# Patient Record
Sex: MALE | Race: BLACK OR AFRICAN AMERICAN | NOT HISPANIC OR LATINO | Employment: OTHER | URBAN - METROPOLITAN AREA
[De-identification: names, ages, dates, MRNs, and addresses within clinical notes are randomized per-mention and may not be internally consistent; named-entity substitution may affect disease eponyms.]

---

## 2020-07-14 ENCOUNTER — OFFICE VISIT (OUTPATIENT)
Dept: UROLOGY | Facility: CLINIC | Age: 45
End: 2020-07-14
Payer: OTHER GOVERNMENT

## 2020-07-14 VITALS — DIASTOLIC BLOOD PRESSURE: 83 MMHG | HEART RATE: 75 BPM | WEIGHT: 218.25 LBS | SYSTOLIC BLOOD PRESSURE: 120 MMHG

## 2020-07-14 DIAGNOSIS — Z12.5 PROSTATE CANCER SCREENING: ICD-10-CM

## 2020-07-14 DIAGNOSIS — N50.89 TESTICULAR LUMP: Primary | ICD-10-CM

## 2020-07-14 PROCEDURE — 99203 PR OFFICE/OUTPT VISIT, NEW, LEVL III, 30-44 MIN: ICD-10-PCS | Mod: S$PBB,,, | Performed by: PHYSICIAN ASSISTANT

## 2020-07-14 PROCEDURE — 99203 OFFICE O/P NEW LOW 30 MIN: CPT | Mod: PBBFAC | Performed by: PHYSICIAN ASSISTANT

## 2020-07-14 PROCEDURE — 99203 OFFICE O/P NEW LOW 30 MIN: CPT | Mod: S$PBB,,, | Performed by: PHYSICIAN ASSISTANT

## 2020-07-14 PROCEDURE — 99999 PR PBB SHADOW E&M-NEW PATIENT-LVL III: ICD-10-PCS | Mod: PBBFAC,,, | Performed by: PHYSICIAN ASSISTANT

## 2020-07-14 PROCEDURE — 99999 PR PBB SHADOW E&M-NEW PATIENT-LVL III: CPT | Mod: PBBFAC,,, | Performed by: PHYSICIAN ASSISTANT

## 2020-07-14 RX ORDER — DOXYCYCLINE 40 MG/1
40 CAPSULE ORAL DAILY
COMMUNITY
End: 2023-03-09

## 2020-07-14 NOTE — PROGRESS NOTES
CHIEF COMPLAINT:    Mr. Hutchins is a 44 y.o. male presenting for testicular lump.  PRESENTING ILLNESS:    Jagdish Hutchins is a 44 y.o. male who presents for right testicular lump.   He discovered it 2 weeks ago. It is not painful but it is uncomfortable.    The pain is intermittent.  Every now and then he feels it but it does not stop him from doing his daily activities.  He does not know if it has grown in size.  He denies any trauma to his testicles.    No frequency, urgency, dysuria, gross hematuria.    He denies a personal and family history of prostate cancer.    REVIEW OF SYSTEMS:    Constitutional: Negative for fever and chills.   HENT: Negative for hearing loss.   Eyes: Negative for visual disturbance.   Respiratory: Negative for shortness of breath.   Cardiovascular: Negative for chest pain.   Gastrointestinal: Negative for vomiting, and constipation.   Genitourinary: See HPI  Neurological: Negative for dizziness.   Hematological: Does not bruise/bleed easily.   Psychiatric/Behavioral: Negative for confusion.       PATIENT HISTORY:    History reviewed. No pertinent past medical history.    History reviewed. No pertinent surgical history.    History reviewed. No pertinent family history.    Social History     Socioeconomic History    Marital status:      Spouse name: Not on file    Number of children: Not on file    Years of education: Not on file    Highest education level: Not on file   Occupational History    Not on file   Social Needs    Financial resource strain: Not on file    Food insecurity     Worry: Not on file     Inability: Not on file    Transportation needs     Medical: Not on file     Non-medical: Not on file   Tobacco Use    Smoking status: Never Smoker    Smokeless tobacco: Never Used   Substance and Sexual Activity    Alcohol use: Not on file    Drug use: Not on file    Sexual activity: Not on file   Lifestyle    Physical activity     Days per week: Not on file     Minutes  per session: Not on file    Stress: Not on file   Relationships    Social connections     Talks on phone: Not on file     Gets together: Not on file     Attends Taoism service: Not on file     Active member of club or organization: Not on file     Attends meetings of clubs or organizations: Not on file     Relationship status: Not on file   Other Topics Concern    Not on file   Social History Narrative    Not on file       Allergies:  Patient has no known allergies.    Medications:    Current Outpatient Medications:     doxycycline (ORACEA) 40 mg capsule, Take 40 mg by mouth once daily., Disp: , Rfl:     PHYSICAL EXAMINATION:    Constitutional: He appears well-developed and well-nourished.  He is in no apparent distress.    Eyes: No scleral icterus noted bilaterally. No discharge bilaterally.    Cardiovascular: Normal rate.      Pulmonary/Chest: Effort normal. No respiratory distress.     Abdominal:  He exhibits no distension.      Neurological: He is alert and oriented to person, place, and time.     Skin: Skin is warm and dry.     Psych: Cooperative with normal affect.    Genitourinary: The penis is circumcised. The left testicle is normal in size, contour and no lesions noted.  There is a firm testicular lesion noted on the right testicle.  It is tender to palpation.  It is nonmobile.  The scrotum is normal in size and contour.    Normal anal sphincter tone. No rectal mass.        Physical Exam      LABS:    U/a: 1.010, pH 8, negative.    No results found for: PSA, PSADIAG, PSATOTAL, PSAFREE, PSAFREEPCT    IMPRESSION:    Encounter Diagnoses   Name Primary?    Testicular lump Yes    Prostate cancer screening          PLAN:    Will get renal ultrasound.   Discussed prostate cancer screening.  Recommended a INA and PSA.  Patient declined NIA today stating he was not prepared for it.  I informed patient that it is recommended to screen for prostate cancer. He voiced understanding.  He was advised to follow  up with urology or his pcp for INA.  He will get a PSA today.       Carlene Petersen PA-C

## 2020-07-14 NOTE — LETTER
July 14, 2020      Bala Bautista MD  [address] [place] [id number]           [facility] - Urology 4th Floor  [address]  [place] [address]  Phone: [phone]          Patient: Jagdish Hutchins   MR Number: [id number]   YOB: 1975   Date of Visit: 7/14/2020       Dear Dr. Bala Bautista:    Thank you for referring Jagdish Hutchins to me for evaluation. Attached you will find relevant portions of my assessment and plan of care.    If you have questions, please do not hesitate to call me. I look forward to following Jagdish Hutchins along with you.    Sincerely,    Carlene Petersen PA-C    Enclosure  CC:  No Recipients    If you would like to receive this communication electronically, please contact [email or web address] or [phone] to request more information on [employer] Link access.    For providers and/or their staff who would like to refer a patient to [facility], please contact us through our one-stop-shop provider referral line, [facility], at [phone].    If you feel you have received this communication in error or would no longer like to receive these types of communications, please e-mail [email or web address]

## 2020-07-17 ENCOUNTER — HOSPITAL ENCOUNTER (OUTPATIENT)
Dept: RADIOLOGY | Facility: HOSPITAL | Age: 45
Discharge: HOME | End: 2020-07-17
Attending: PHYSICIAN ASSISTANT
Payer: OTHER GOVERNMENT

## 2020-07-17 ENCOUNTER — TELEPHONE (OUTPATIENT)
Dept: UROLOGY | Facility: CLINIC | Age: 45
End: 2020-07-17

## 2020-07-17 DIAGNOSIS — N50.89 TESTICULAR LUMP: ICD-10-CM

## 2020-07-17 PROCEDURE — 76870 US EXAM SCROTUM: CPT | Mod: 26,,, | Performed by: RADIOLOGY

## 2020-07-17 PROCEDURE — 76870 US EXAM SCROTUM: CPT | Mod: TC

## 2020-07-17 PROCEDURE — 76870 US SCROTUM AND TESTICLES: ICD-10-PCS | Mod: 26,,, | Performed by: RADIOLOGY

## 2020-07-17 NOTE — TELEPHONE ENCOUNTER
Called patient's number and was unable to leave a message to discuss ultrasound.  I called his wife number and left message for he to have  call back.

## 2020-07-17 NOTE — TELEPHONE ENCOUNTER
Patient notified of his results.  Patient notified that I have sent a message for staff to review his imaging as well and I will contact him with their recommendations.

## 2020-07-20 ENCOUNTER — TELEPHONE (OUTPATIENT)
Dept: UROLOGY | Facility: CLINIC | Age: 45
End: 2020-07-20

## 2020-07-20 DIAGNOSIS — N50.89 TESTICULAR NODULE: Primary | ICD-10-CM

## 2020-07-20 RX ORDER — OXAPROZIN 600 MG/1
600 TABLET, FILM COATED ORAL EVERY 12 HOURS
Qty: 60 TABLET | Refills: 1 | Status: SHIPPED | OUTPATIENT
Start: 2020-07-20 | End: 2020-08-19

## 2020-07-20 NOTE — PROGRESS NOTES
Pt notified of Dr. Chen's recommendations.  Daypro given for pain.  F/u scrotal ultrasound ashleigh for 8/31.

## 2020-07-20 NOTE — TELEPHONE ENCOUNTER
Carlene,    On the US, it looks like a para testicular lesion ( as the report says, it appears to have a solid component in it but most likely it is a complex cyst).  Since it is tender to palpate, why don't you give him NAIDS for now and repeat US in 6 wks.

## 2020-07-20 NOTE — TELEPHONE ENCOUNTER
----- Message from aCrlene Petersen PA-C sent at 7/17/2020  3:45 PM CDT -----  Hey,  He was seen for a palpable testicular lesion that was tender to touch on exam.  I did a scrotal u/s.    Scrotal u/s showed  possible solid lesion versus complex cyst.  Do you mind looking at the images.  I just want to make sure it does not look solid and I will have him repeat u/s in 3 months.      Thanks,    Carlene

## 2020-08-31 ENCOUNTER — HOSPITAL ENCOUNTER (OUTPATIENT)
Dept: RADIOLOGY | Facility: HOSPITAL | Age: 45
Discharge: HOME | End: 2020-08-31
Attending: PHYSICIAN ASSISTANT
Payer: OTHER GOVERNMENT

## 2020-08-31 DIAGNOSIS — N50.89 TESTICULAR NODULE: ICD-10-CM

## 2020-08-31 PROCEDURE — 76870 US EXAM SCROTUM: CPT | Mod: TC

## 2020-08-31 PROCEDURE — 76870 US SCROTUM AND TESTICLES: ICD-10-PCS | Mod: 26,,, | Performed by: RADIOLOGY

## 2020-08-31 PROCEDURE — 76870 US EXAM SCROTUM: CPT | Mod: 26,,, | Performed by: RADIOLOGY

## 2020-09-02 ENCOUNTER — TELEPHONE (OUTPATIENT)
Dept: UROLOGY | Facility: CLINIC | Age: 45
End: 2020-09-02

## 2020-09-02 DIAGNOSIS — N50.89 TESTICULAR NODULE: Primary | ICD-10-CM

## 2020-09-02 NOTE — TELEPHONE ENCOUNTER
Patient notified of his testicular ultrasound results.    Lesion is stable and malignant process is less likely.  Will repeat u/s in 3 months. Recommend he continue to do self exams and contact the office if he has any concerns.

## 2020-12-08 ENCOUNTER — HOSPITAL ENCOUNTER (OUTPATIENT)
Dept: RADIOLOGY | Facility: HOSPITAL | Age: 45
Discharge: HOME | End: 2020-12-08
Attending: PHYSICIAN ASSISTANT
Payer: OTHER GOVERNMENT

## 2020-12-08 ENCOUNTER — TELEPHONE (OUTPATIENT)
Dept: UROLOGY | Facility: CLINIC | Age: 45
End: 2020-12-08

## 2020-12-08 DIAGNOSIS — N50.89 TESTICULAR NODULE: ICD-10-CM

## 2020-12-08 DIAGNOSIS — I86.1 RIGHT VARICOCELE: Primary | ICD-10-CM

## 2020-12-08 PROCEDURE — 76870 US EXAM SCROTUM: CPT | Mod: TC

## 2020-12-08 PROCEDURE — 76870 US EXAM SCROTUM: CPT | Mod: 26,,, | Performed by: RADIOLOGY

## 2020-12-08 PROCEDURE — 76870 US SCROTUM AND TESTICLES: ICD-10-PCS | Mod: 26,,, | Performed by: RADIOLOGY

## 2020-12-08 NOTE — TELEPHONE ENCOUNTER
Patient informed of scrotal ultrasound results.  Discussed varicocele.  Will get renal ultrasound to follow up on right varicocele.  Patient notified of time and date.

## 2020-12-21 ENCOUNTER — HOSPITAL ENCOUNTER (OUTPATIENT)
Dept: RADIOLOGY | Facility: HOSPITAL | Age: 45
Discharge: HOME | End: 2020-12-21
Attending: PHYSICIAN ASSISTANT
Payer: OTHER GOVERNMENT

## 2020-12-21 DIAGNOSIS — I86.1 RIGHT VARICOCELE: ICD-10-CM

## 2020-12-21 PROCEDURE — 76770 US RETROPERITONEAL COMPLETE: ICD-10-PCS | Mod: 26,,, | Performed by: INTERNAL MEDICINE

## 2020-12-21 PROCEDURE — 76770 US EXAM ABDO BACK WALL COMP: CPT | Mod: 26,,, | Performed by: INTERNAL MEDICINE

## 2020-12-21 PROCEDURE — 76770 US EXAM ABDO BACK WALL COMP: CPT | Mod: TC

## 2020-12-22 ENCOUNTER — TELEPHONE (OUTPATIENT)
Dept: UROLOGY | Facility: CLINIC | Age: 45
End: 2020-12-22

## 2021-03-19 LAB — CRC RECOMMENDATION EXT: NORMAL

## 2021-04-14 ENCOUNTER — OFFICE VISIT (OUTPATIENT)
Dept: URGENT CARE | Facility: CLINIC | Age: 46
End: 2021-04-14
Payer: OTHER GOVERNMENT

## 2021-04-14 VITALS
HEART RATE: 80 BPM | OXYGEN SATURATION: 100 % | TEMPERATURE: 98 F | SYSTOLIC BLOOD PRESSURE: 117 MMHG | DIASTOLIC BLOOD PRESSURE: 85 MMHG | RESPIRATION RATE: 16 BRPM

## 2021-04-14 DIAGNOSIS — R43.9 PROBLEMS WITH SMELL AND TASTE: ICD-10-CM

## 2021-04-14 DIAGNOSIS — Z20.822 ENCOUNTER FOR LABORATORY TESTING FOR COVID-19 VIRUS: Primary | ICD-10-CM

## 2021-04-14 PROCEDURE — 99204 PR OFFICE/OUTPT VISIT, NEW, LEVL IV, 45-59 MIN: ICD-10-PCS | Mod: S$GLB,,, | Performed by: EMERGENCY MEDICINE

## 2021-04-14 PROCEDURE — 99204 OFFICE O/P NEW MOD 45 MIN: CPT | Mod: S$GLB,,, | Performed by: EMERGENCY MEDICINE

## 2021-04-14 PROCEDURE — U0003 INFECTIOUS AGENT DETECTION BY NUCLEIC ACID (DNA OR RNA); SEVERE ACUTE RESPIRATORY SYNDROME CORONAVIRUS 2 (SARS-COV-2) (CORONAVIRUS DISEASE [COVID-19]), AMPLIFIED PROBE TECHNIQUE, MAKING USE OF HIGH THROUGHPUT TECHNOLOGIES AS DESCRIBED BY CMS-2020-01-R: HCPCS | Performed by: NURSE PRACTITIONER

## 2021-04-14 PROCEDURE — U0005 INFEC AGEN DETEC AMPLI PROBE: HCPCS | Performed by: NURSE PRACTITIONER

## 2021-04-15 DIAGNOSIS — U07.1 COVID-19 VIRUS DETECTED: ICD-10-CM

## 2021-04-15 LAB — SARS-COV-2 RNA RESP QL NAA+PROBE: DETECTED

## 2021-04-28 ENCOUNTER — OFFICE VISIT (OUTPATIENT)
Dept: URGENT CARE | Facility: CLINIC | Age: 46
End: 2021-04-28
Payer: OTHER GOVERNMENT

## 2021-04-28 VITALS
OXYGEN SATURATION: 98 % | SYSTOLIC BLOOD PRESSURE: 131 MMHG | DIASTOLIC BLOOD PRESSURE: 91 MMHG | RESPIRATION RATE: 16 BRPM | TEMPERATURE: 99 F | HEART RATE: 85 BPM

## 2021-04-28 DIAGNOSIS — Z20.822 ENCOUNTER FOR LABORATORY TESTING FOR COVID-19 VIRUS: Primary | ICD-10-CM

## 2021-04-28 DIAGNOSIS — Z20.822 COVID-19 VIRUS NOT DETECTED: ICD-10-CM

## 2021-04-28 LAB
CTP QC/QA: YES
SARS-COV-2 RDRP RESP QL NAA+PROBE: NEGATIVE

## 2021-04-28 PROCEDURE — 99213 PR OFFICE/OUTPT VISIT, EST, LEVL III, 20-29 MIN: ICD-10-PCS | Mod: S$GLB,,, | Performed by: EMERGENCY MEDICINE

## 2021-04-28 PROCEDURE — 99213 OFFICE O/P EST LOW 20 MIN: CPT | Mod: S$GLB,,, | Performed by: EMERGENCY MEDICINE

## 2021-04-28 PROCEDURE — U0002: ICD-10-PCS | Mod: QW,S$GLB,, | Performed by: EMERGENCY MEDICINE

## 2021-04-28 PROCEDURE — U0002 COVID-19 LAB TEST NON-CDC: HCPCS | Mod: QW,S$GLB,, | Performed by: EMERGENCY MEDICINE

## 2021-04-30 ENCOUNTER — OFFICE VISIT (OUTPATIENT)
Dept: URGENT CARE | Facility: CLINIC | Age: 46
End: 2021-04-30
Payer: OTHER GOVERNMENT

## 2021-04-30 VITALS
OXYGEN SATURATION: 99 % | SYSTOLIC BLOOD PRESSURE: 144 MMHG | TEMPERATURE: 99 F | RESPIRATION RATE: 16 BRPM | DIASTOLIC BLOOD PRESSURE: 97 MMHG | HEART RATE: 74 BPM

## 2021-04-30 DIAGNOSIS — Z20.822 ENCOUNTER FOR LABORATORY TESTING FOR COVID-19 VIRUS: Primary | ICD-10-CM

## 2021-04-30 PROCEDURE — U0003 INFECTIOUS AGENT DETECTION BY NUCLEIC ACID (DNA OR RNA); SEVERE ACUTE RESPIRATORY SYNDROME CORONAVIRUS 2 (SARS-COV-2) (CORONAVIRUS DISEASE [COVID-19]), AMPLIFIED PROBE TECHNIQUE, MAKING USE OF HIGH THROUGHPUT TECHNOLOGIES AS DESCRIBED BY CMS-2020-01-R: HCPCS | Performed by: NURSE PRACTITIONER

## 2021-04-30 PROCEDURE — 99214 PR OFFICE/OUTPT VISIT, EST, LEVL IV, 30-39 MIN: ICD-10-PCS | Mod: S$GLB,,, | Performed by: EMERGENCY MEDICINE

## 2021-04-30 PROCEDURE — U0005 INFEC AGEN DETEC AMPLI PROBE: HCPCS | Performed by: NURSE PRACTITIONER

## 2021-04-30 PROCEDURE — 99214 OFFICE O/P EST MOD 30 MIN: CPT | Mod: S$GLB,,, | Performed by: EMERGENCY MEDICINE

## 2021-05-01 LAB — SARS-COV-2 RNA RESP QL NAA+PROBE: NOT DETECTED

## 2021-08-06 ENCOUNTER — LAB VISIT (OUTPATIENT)
Dept: URGENT CARE | Facility: CLINIC | Age: 46
End: 2021-08-06
Payer: OTHER GOVERNMENT

## 2021-08-06 DIAGNOSIS — Z20.822 ENCOUNTER FOR LABORATORY TESTING FOR COVID-19 VIRUS: Primary | ICD-10-CM

## 2021-08-06 LAB
CTP QC/QA: YES
SARS-COV-2 RDRP RESP QL NAA+PROBE: NEGATIVE

## 2021-08-06 PROCEDURE — U0002: ICD-10-PCS | Mod: QW,S$GLB,, | Performed by: EMERGENCY MEDICINE

## 2021-08-06 PROCEDURE — U0002 COVID-19 LAB TEST NON-CDC: HCPCS | Mod: QW,S$GLB,, | Performed by: EMERGENCY MEDICINE

## 2023-03-09 ENCOUNTER — OFFICE VISIT (OUTPATIENT)
Dept: FAMILY MEDICINE | Facility: CLINIC | Age: 48
End: 2023-03-09
Payer: OTHER GOVERNMENT

## 2023-03-09 VITALS
SYSTOLIC BLOOD PRESSURE: 128 MMHG | OXYGEN SATURATION: 100 % | BODY MASS INDEX: 29.22 KG/M2 | DIASTOLIC BLOOD PRESSURE: 88 MMHG | HEART RATE: 89 BPM | TEMPERATURE: 99 F | HEIGHT: 76 IN | RESPIRATION RATE: 18 BRPM | WEIGHT: 240 LBS

## 2023-03-09 DIAGNOSIS — R03.0 ELEVATED BP WITHOUT DIAGNOSIS OF HYPERTENSION: ICD-10-CM

## 2023-03-09 DIAGNOSIS — G47.33 OSA (OBSTRUCTIVE SLEEP APNEA): ICD-10-CM

## 2023-03-09 DIAGNOSIS — R06.2 BILATERAL WHEEZING: ICD-10-CM

## 2023-03-09 DIAGNOSIS — Z13.6 ENCOUNTER FOR LIPID SCREENING FOR CARDIOVASCULAR DISEASE: ICD-10-CM

## 2023-03-09 DIAGNOSIS — Z76.89 ENCOUNTER TO ESTABLISH CARE: Primary | ICD-10-CM

## 2023-03-09 DIAGNOSIS — Z11.59 NEED FOR HEPATITIS C SCREENING TEST: ICD-10-CM

## 2023-03-09 DIAGNOSIS — J01.40 ACUTE NON-RECURRENT PANSINUSITIS: ICD-10-CM

## 2023-03-09 DIAGNOSIS — Z11.4 SCREENING FOR HIV (HUMAN IMMUNODEFICIENCY VIRUS): ICD-10-CM

## 2023-03-09 DIAGNOSIS — Z00.00 HEALTHCARE MAINTENANCE: ICD-10-CM

## 2023-03-09 DIAGNOSIS — Z13.1 SCREENING FOR DIABETES MELLITUS: ICD-10-CM

## 2023-03-09 DIAGNOSIS — Z13.220 ENCOUNTER FOR LIPID SCREENING FOR CARDIOVASCULAR DISEASE: ICD-10-CM

## 2023-03-09 PROCEDURE — 99204 OFFICE O/P NEW MOD 45 MIN: CPT | Performed by: NURSE PRACTITIONER

## 2023-03-09 PROCEDURE — 99204 PR OFFICE/OUTPT VISIT, NEW, LEVL IV, 45-59 MIN: ICD-10-PCS | Mod: S$PBB,,, | Performed by: NURSE PRACTITIONER

## 2023-03-09 PROCEDURE — 99204 OFFICE O/P NEW MOD 45 MIN: CPT | Mod: S$PBB,,, | Performed by: NURSE PRACTITIONER

## 2023-03-09 RX ORDER — DOXYCYCLINE HYCLATE 100 MG
100 TABLET ORAL 2 TIMES DAILY
Qty: 14 TABLET | Refills: 0 | Status: SHIPPED | OUTPATIENT
Start: 2023-03-09 | End: 2023-03-30

## 2023-03-09 RX ORDER — METHYLPREDNISOLONE 4 MG/1
TABLET ORAL
Qty: 21 EACH | Refills: 0 | Status: SHIPPED | OUTPATIENT
Start: 2023-03-09 | End: 2023-03-30

## 2023-03-09 NOTE — PROGRESS NOTES
SUBJECTIVE:      Patient ID: Jagdish Hutchins is a 47 y.o. male.    Chief Complaint: Sinusitis    47-year-old male presents to the clinic as a new patient with complaints of sinusitis.      PMH significant for sleep apnea and atrial fibrillation.  He reports he is borderline hypertensive but has not been diagnosed with hypertension at this time.  He does not currently take any prescription medications.  He is a nonsmoker.  Alcohol consumption is occasional.  Denies illicit drug use.     Uses CPAP, but has been unable breath due to sinus issues x3 weeks, and has not been able to wear the mask.  Reports frontal and maxillary sinus pressure.  Congestion is constant causing some headaches.  He is tried multiple over-the-counter remedies such as [place] plus cold and flu, nasal decongestants, nasal sprays, Mucinex, and antihistamines.  Denies cough, shortness of breath, and fever.      Overdue healthcare maintenance reviewed.  Patient received a colonoscopy in 2020 by Dr. Castillo, will request a copy of his records.  Routine labs and screenings ordered.  Patient declined the influenza vaccine as it is late in the flu season.      No family history on file.   Social History     Socioeconomic History    Marital status:    Tobacco Use    Smoking status: Never    Smokeless tobacco: Never     Current Outpatient Medications   Medication Sig Dispense Refill    doxycycline (VIBRA-TABS) 100 MG tablet Take 1 tablet (100 mg total) by mouth 2 (two) times daily. 14 tablet 0    methylPREDNISolone (MEDROL DOSEPACK) 4 mg tablet use as directed 21 each 0     No current facility-administered medications for this visit.     Review of patient's allergies indicates:  No Known Allergies   No past medical history on file.  No past surgical history on file.    Review of Systems   Constitutional:  Negative for activity change, appetite change, chills, diaphoresis, fatigue, fever and unexpected weight change.   HENT:  Positive for  "congestion, rhinorrhea, sinus pressure and sinus pain. Negative for ear pain, sore throat, trouble swallowing and voice change.    Eyes:  Negative for pain, discharge and visual disturbance.   Respiratory:  Negative for cough, chest tightness, shortness of breath and wheezing.         Sleep apnea, wears CPAP   Cardiovascular:  Negative for chest pain and palpitations.   Gastrointestinal:  Negative for abdominal pain, constipation, diarrhea, nausea and vomiting.   Genitourinary:  Negative for difficulty urinating, flank pain, frequency and urgency.   Musculoskeletal:  Negative for back pain and joint swelling.   Skin:  Negative for color change and rash.   Neurological:  Negative for dizziness, seizures, syncope, weakness, numbness and headaches.   Hematological:  Negative for adenopathy.   Psychiatric/Behavioral:  Negative for dysphoric mood and sleep disturbance. The patient is not nervous/anxious.     OBJECTIVE:      Vitals:    03/09/23 1513 03/09/23 1540   BP: (!) 134/92 128/88   BP Location: Right arm    Patient Position: Sitting    BP Method: Medium (Manual)    Pulse: 89    Resp: 18    Temp: 98.5 °F (36.9 °C)    TempSrc: Oral    SpO2: 100%    Weight: 108.9 kg (240 lb)    Height: 6' 4" (1.93 m)      Physical Exam  Vitals and nursing note reviewed.   Constitutional:       General: He is awake. He is not in acute distress.     Appearance: He is overweight. He is not ill-appearing, toxic-appearing or diaphoretic.   HENT:      Head: Normocephalic and atraumatic.      Right Ear: Tympanic membrane, ear canal and external ear normal.      Left Ear: Tympanic membrane, ear canal and external ear normal.      Nose: Congestion present.      Right Sinus: Maxillary sinus tenderness and frontal sinus tenderness present.      Left Sinus: Maxillary sinus tenderness and frontal sinus tenderness present.      Mouth/Throat:      Lips: Pink.      Mouth: Mucous membranes are moist.      Pharynx: Oropharynx is clear. Uvula midline. " No oropharyngeal exudate or posterior oropharyngeal erythema.   Eyes:      General: Lids are normal. Gaze aligned appropriately.      Conjunctiva/sclera: Conjunctivae normal.      Right eye: Right conjunctiva is not injected.      Left eye: Left conjunctiva is not injected.      Pupils: Pupils are equal, round, and reactive to light.   Cardiovascular:      Rate and Rhythm: Normal rate and regular rhythm.      Pulses: Normal pulses.      Heart sounds: Normal heart sounds, S1 normal and S2 normal. No murmur heard.    No friction rub. No gallop.   Pulmonary:      Effort: Pulmonary effort is normal. No respiratory distress.      Breath sounds: No stridor. Wheezing present. No decreased breath sounds, rhonchi or rales.      Comments: Faint wheezing noted throughout  Chest:      Chest wall: No tenderness.   Musculoskeletal:      Cervical back: Neck supple.      Right lower leg: No edema.      Left lower leg: No edema.   Lymphadenopathy:      Cervical: No cervical adenopathy.   Skin:     General: Skin is warm and dry.      Capillary Refill: Capillary refill takes less than 2 seconds.      Findings: No erythema or rash.   Neurological:      Mental Status: He is alert and oriented to person, place, and time. Mental status is at baseline.   Psychiatric:         Attention and Perception: Attention normal.         Mood and Affect: Mood normal.         Speech: Speech normal.         Behavior: Behavior normal. Behavior is cooperative.         Thought Content: Thought content normal.         Judgment: Judgment normal.      Assessment:       1. Encounter to establish care    2. Acute non-recurrent pansinusitis    3. Bilateral wheezing    4. Elevated BP without diagnosis of hypertension    5. Healthcare maintenance    6. Encounter for lipid screening for cardiovascular disease    7. Screening for HIV (human immunodeficiency virus)    8. Need for hepatitis C screening test    9. Screening for diabetes mellitus        Plan:        Encounter to establish care  New patient.  Will start getting his routine care and overdue health maintenance completed.  Advised patient about age and season appropriate immunizations/ cancer screenings. Influenza yearly and Tdap vaccine ever 10 years.  Age appropriate screenings ordered.    Acute non-recurrent pansinusitis  Will start patient on doxycycline and Medrol Dosepak.  Patient needs to limit his sun exposure while on doxycycline.  Continue antihistamines such as Zyrtec.  Continue Flonase or other nasal spray.  Limit decongestants due to elevated BP.  -     doxycycline (VIBRA-TABS) 100 MG tablet; Take 1 tablet (100 mg total) by mouth 2 (two) times daily.  Dispense: 14 tablet; Refill: 0  -     methylPREDNISolone (MEDROL DOSEPACK) 4 mg tablet; use as directed  Dispense: 21 each; Refill: 0    Bilateral wheezing  Patient has not have a cough or shortness of breath.  Patient started on doxycycline and Medrol Dosepak.   -     doxycycline (VIBRA-TABS) 100 MG tablet; Take 1 tablet (100 mg total) by mouth 2 (two) times daily.  Dispense: 14 tablet; Refill: 0  -     methylPREDNISolone (MEDROL DOSEPACK) 4 mg tablet; use as directed  Dispense: 21 each; Refill: 0    Elevated BP without diagnosis of hypertension  Diastolic reading on blood pressure was elevated today.  Will have patient follow-up in a month to recheck BP and review labs.  BP could be elevated due to recent decongestant use.  Low-sodium diet recommended.  -     Comprehensive Metabolic Panel; Future; Expected date: 03/09/2023  -     Lipid Panel; Future; Expected date: 03/09/2023  -     TSH; Future; Expected date: 03/09/2023    STEFAN (obstructive sleep apnea)  Patient is being treated for sinusitis.  He has been unable to use his CPAP x3 weeks due to sinus congestion.  Patient encouraged to restart CPAP once the congestion improves.    Healthcare maintenance  -     Comprehensive Metabolic Panel; Future; Expected date: 03/09/2023  -     Lipid Panel; Future;  Expected date: 03/09/2023  -     TSH; Future; Expected date: 03/09/2023  -     Hepatitis C Antibody; Future; Expected date: 03/09/2023  -     HIV 1/2 Ag/Ab (4th Gen); Future; Expected date: 03/09/2023  -     Hemoglobin A1C; Future; Expected date: 03/09/2023    Encounter for lipid screening for cardiovascular disease  -     Lipid Panel; Future; Expected date: 03/09/2023    Screening for HIV (human immunodeficiency virus)  -     HIV 1/2 Ag/Ab (4th Gen); Future; Expected date: 03/09/2023    Need for hepatitis C screening test  -     Hepatitis C Antibody; Future; Expected date: 03/09/2023    Screening for diabetes mellitus  -     Hemoglobin A1C; Future; Expected date: 03/09/2023    This note was created using [employer] voice recognition software that occasionally misinterprets phrases or words.     Follow up in about 1 month (around 4/9/2023) for HTN.      3/9/2023 Renny Chatman, ERIKA, FNP

## 2023-03-30 ENCOUNTER — OFFICE VISIT (OUTPATIENT)
Dept: FAMILY MEDICINE | Facility: CLINIC | Age: 48
End: 2023-03-30
Payer: OTHER GOVERNMENT

## 2023-03-30 ENCOUNTER — TELEPHONE (OUTPATIENT)
Dept: DERMATOLOGY | Facility: CLINIC | Age: 48
End: 2023-03-30
Payer: OTHER GOVERNMENT

## 2023-03-30 VITALS
HEIGHT: 76 IN | WEIGHT: 241 LBS | BODY MASS INDEX: 29.35 KG/M2 | HEART RATE: 88 BPM | DIASTOLIC BLOOD PRESSURE: 80 MMHG | OXYGEN SATURATION: 98 % | SYSTOLIC BLOOD PRESSURE: 124 MMHG | TEMPERATURE: 99 F

## 2023-03-30 DIAGNOSIS — E66.3 OVERWEIGHT (BMI 25.0-29.9): ICD-10-CM

## 2023-03-30 DIAGNOSIS — L29.9 SCALP ITCH: ICD-10-CM

## 2023-03-30 DIAGNOSIS — R21 FACIAL RASH: Primary | ICD-10-CM

## 2023-03-30 DIAGNOSIS — L20.9 ATOPIC DERMATITIS OF SCALP: ICD-10-CM

## 2023-03-30 LAB
ALBUMIN SERPL-MCNC: 4.3 G/DL (ref 4–5)
ALBUMIN/GLOB SERPL: 1.3 {RATIO} (ref 1.2–2.2)
ALP SERPL-CCNC: 64 IU/L (ref 44–121)
ALT SERPL-CCNC: 23 IU/L (ref 0–44)
AST SERPL-CCNC: 35 IU/L (ref 0–40)
BILIRUB SERPL-MCNC: 0.8 MG/DL (ref 0–1.2)
BUN SERPL-MCNC: 10 MG/DL (ref 6–24)
BUN/CREAT SERPL: 13 (ref 9–20)
CALCIUM SERPL-MCNC: 9.3 MG/DL (ref 8.7–10.2)
CHLORIDE SERPL-SCNC: 103 MMOL/L (ref 96–106)
CHOLEST SERPL-MCNC: 206 MG/DL (ref 100–199)
CO2 SERPL-SCNC: 25 MMOL/L (ref 20–29)
CREAT SERPL-MCNC: 0.8 MG/DL (ref 0.76–1.27)
EST. GFR  (NO RACE VARIABLE): 109 ML/MIN/1.73
GLOBULIN SER CALC-MCNC: 3.4 G/DL (ref 1.5–4.5)
GLUCOSE SERPL-MCNC: 82 MG/DL (ref 70–99)
HBA1C MFR BLD: 4.9 % (ref 4.8–5.6)
HCV IGG SERPL QL IA: NON REACTIVE
HDLC SERPL-MCNC: 60 MG/DL
HIV 1+2 AB+HIV1 P24 AG SERPL QL IA: NON REACTIVE
LDLC SERPL CALC-MCNC: 128 MG/DL (ref 0–99)
POTASSIUM SERPL-SCNC: 3.9 MMOL/L (ref 3.5–5.2)
PROT SERPL-MCNC: 7.7 G/DL (ref 6–8.5)
SODIUM SERPL-SCNC: 139 MMOL/L (ref 134–144)
TRIGL SERPL-MCNC: 103 MG/DL (ref 0–149)
TSH SERPL DL<=0.005 MIU/L-ACNC: 0.88 UIU/ML (ref 0.45–4.5)
VLDLC SERPL CALC-MCNC: 18 MG/DL (ref 5–40)

## 2023-03-30 PROCEDURE — 99213 PR OFFICE/OUTPT VISIT, EST, LEVL III, 20-29 MIN: ICD-10-PCS | Mod: S$PBB,,, | Performed by: NURSE PRACTITIONER

## 2023-03-30 PROCEDURE — 99213 OFFICE O/P EST LOW 20 MIN: CPT | Mod: S$PBB,,, | Performed by: NURSE PRACTITIONER

## 2023-03-30 PROCEDURE — 99214 OFFICE O/P EST MOD 30 MIN: CPT | Performed by: NURSE PRACTITIONER

## 2023-03-30 RX ORDER — KETOCONAZOLE 20 MG/ML
SHAMPOO, SUSPENSION TOPICAL
Qty: 120 ML | Refills: 1 | Status: SHIPPED | OUTPATIENT
Start: 2023-03-30

## 2023-03-30 RX ORDER — CEPHALEXIN 500 MG/1
500 CAPSULE ORAL EVERY 6 HOURS
Qty: 40 CAPSULE | Refills: 0 | Status: SHIPPED | OUTPATIENT
Start: 2023-03-30

## 2023-03-30 NOTE — TELEPHONE ENCOUNTER
Derm referral scheduled with patient: 8/2023 0830  [facility]. Staff message for sooner appointment: facial, neck, head rash x4 months, worsening, itch, redness, fluid. Discussed contacting insurance for in-network providers to contact for possibly sooner appointment. Patient agrees with plan and thanks for call. Patient hasn't used portal since 5/2021.

## 2023-03-30 NOTE — PROGRESS NOTES
Subjective:       Patient ID: Jagdish Hutchins is a 47 y.o. male.    Chief Complaint: face and back of head broken out    Patient presents today as a patient of SENTHIL Latham and a new patient to me. Patient presents for evaluation for rash on the face and back of the scalp. Patient is requesting a dermatology referral.   Patient has not used anything other than soap and water for treatment.  Patient is overweight with a BMI of 29.34    Rash  This is a new problem. The current episode started more than 1 month ago. The problem has been waxing and waning since onset. The affected locations include the scalp and face. The rash is characterized by redness and blistering. He was exposed to nothing (Growing hair longer). Pertinent negatives include no anorexia, congestion, cough, diarrhea, eye pain, facial edema, fatigue, fever, joint pain, rhinorrhea, shortness of breath, sore throat or vomiting. Past treatments include moisturizer. The treatment provided no relief. There is no history of eczema.   Review of Systems   Constitutional:  Negative for appetite change, chills, diaphoresis, fatigue, fever and unexpected weight change.        Overweight   HENT:  Negative for congestion, ear discharge, ear pain, hearing loss, rhinorrhea, sore throat, trouble swallowing and voice change.    Eyes:  Negative for photophobia, pain and visual disturbance.   Respiratory:  Negative for cough, chest tightness and shortness of breath.    Cardiovascular:  Negative for chest pain, palpitations and leg swelling.   Gastrointestinal:  Negative for abdominal pain, anorexia, constipation, diarrhea, nausea and vomiting.   Endocrine: Negative for cold intolerance and heat intolerance.   Genitourinary:  Negative for difficulty urinating, dysuria and flank pain.   Musculoskeletal:  Negative for arthralgias, gait problem, joint pain and myalgias.   Skin:  Positive for color change and rash.   Allergic/Immunologic: Negative for immunocompromised  "state.   Neurological:  Negative for dizziness, weakness and headaches.   Hematological:  Negative for adenopathy.   Psychiatric/Behavioral:  Negative for agitation, confusion, self-injury and suicidal ideas.    History reviewed. No pertinent past medical history. History reviewed. No pertinent surgical history.    Family History   Problem Relation Age of Onset    Cancer Mother     Heart disease Father        Social History     Socioeconomic History    Marital status:    Tobacco Use    Smoking status: Never    Smokeless tobacco: Never   Substance and Sexual Activity    Alcohol use: Yes     Comment: socially    Drug use: Never    Sexual activity: Yes     Partners: Female       Current Outpatient Medications   Medication Sig Dispense Refill    cephALEXin (KEFLEX) 500 MG capsule Take 1 capsule (500 mg total) by mouth every 6 (six) hours. 40 capsule 0    ketoconazole (NIZORAL) 2 % shampoo Apply topically twice a week. 120 mL 1     No current facility-administered medications for this visit.       Review of patient's allergies indicates:  No Known Allergies  Objective:      Blood pressure 124/80, pulse 88, temperature 98.5 °F (36.9 °C), height 6' 4" (1.93 m), weight 109.3 kg (241 lb), SpO2 98 %. Body mass index is 29.34 kg/m².   Physical Exam  Vitals and nursing note reviewed.   Constitutional:       General: He is not in acute distress.     Appearance: Normal appearance. He is well-developed.   HENT:      Head: Normocephalic and atraumatic.        Comments: Hair line and facial hair folliculitis noted.  Scalp scaling and dryness noted.     Nose: Nose normal.      Mouth/Throat:      Mouth: Mucous membranes are moist.      Pharynx: Uvula midline.   Eyes:      General: Lids are normal.      Extraocular Movements: Extraocular movements intact.      Conjunctiva/sclera: Conjunctivae normal.      Pupils: Pupils are equal, round, and reactive to light.   Cardiovascular:      Rate and Rhythm: Normal rate and regular " rhythm.      Pulses: Normal pulses.      Heart sounds: Normal heart sounds, S1 normal and S2 normal. No murmur heard.  Pulmonary:      Effort: Pulmonary effort is normal. No respiratory distress.      Breath sounds: Normal breath sounds.   Musculoskeletal:         General: Normal range of motion.      Cervical back: Normal range of motion and neck supple.   Lymphadenopathy:      Cervical: No cervical adenopathy.   Skin:     General: Skin is warm and dry.      Findings: No rash.   Neurological:      Mental Status: He is alert and oriented to person, place, and time.   Psychiatric:         Mood and Affect: Mood normal.         Speech: Speech normal.         Behavior: Behavior normal.         Thought Content: Thought content normal.         Judgment: Judgment normal.           Assessment:       1. Facial rash    2. Scalp itch    3. Atopic dermatitis of scalp    4. Overweight (BMI 25.0-29.9)          Plan:       Jagdish was seen today for face and back of head broken out.    Diagnoses and all orders for this visit:    Facial rash  -     Ambulatory referral/consult to Dermatology; Future  -     cephALEXin (KEFLEX) 500 MG capsule; Take 1 capsule (500 mg total) by mouth every 6 (six) hours.    Scalp itch  Topical shampoo prescribed    Atopic dermatitis of scalp  -     ketoconazole (NIZORAL) 2 % shampoo; Apply topically twice a week.    Overweight (BMI 25.0-29.9)  The patient is asked to make an attempt to improve diet and exercise patterns to aid in medical management of this problem.

## 2023-04-03 DIAGNOSIS — R21 FACIAL RASH: Primary | ICD-10-CM

## 2023-04-05 ENCOUNTER — OFFICE VISIT (OUTPATIENT)
Dept: FAMILY MEDICINE | Facility: CLINIC | Age: 48
End: 2023-04-05
Payer: OTHER GOVERNMENT

## 2023-04-05 VITALS
HEART RATE: 69 BPM | OXYGEN SATURATION: 98 % | DIASTOLIC BLOOD PRESSURE: 88 MMHG | WEIGHT: 239.19 LBS | BODY MASS INDEX: 29.13 KG/M2 | TEMPERATURE: 98 F | SYSTOLIC BLOOD PRESSURE: 122 MMHG | HEIGHT: 76 IN

## 2023-04-05 DIAGNOSIS — E78.5 DYSLIPIDEMIA: Primary | ICD-10-CM

## 2023-04-05 PROCEDURE — 99213 OFFICE O/P EST LOW 20 MIN: CPT | Mod: S$PBB,,, | Performed by: NURSE PRACTITIONER

## 2023-04-05 PROCEDURE — 99213 PR OFFICE/OUTPT VISIT, EST, LEVL III, 20-29 MIN: ICD-10-PCS | Mod: S$PBB,,, | Performed by: NURSE PRACTITIONER

## 2023-04-05 PROCEDURE — 99214 OFFICE O/P EST MOD 30 MIN: CPT | Performed by: NURSE PRACTITIONER

## 2023-04-05 NOTE — PROGRESS NOTES
SUBJECTIVE:      Patient ID: Jagdish Hutchins is a 47 y.o. male.    Chief Complaint: lab review    47-year-old male presents to the clinic for lab review.  Labs completed recently were stable. Cholesterol slightly elevated,  and total cholesterol 206. His 10 year risk for atherosclerotic cardiovascular disease is low at 4.3%. Remaining labs were wnl. Hep C and HIV screenings were negative. Colonscopy completed in 2021 by Dr. Doty, cleared x5 years.  He is doing well today and offers no new complaints.       Family History   Problem Relation Age of Onset    Cancer Mother     Heart disease Father       Social History     Socioeconomic History    Marital status:    Tobacco Use    Smoking status: Never    Smokeless tobacco: Never   Substance and Sexual Activity    Alcohol use: Yes     Comment: socially    Drug use: Never    Sexual activity: Yes     Partners: Female     Current Outpatient Medications   Medication Sig Dispense Refill    cephALEXin (KEFLEX) 500 MG capsule Take 1 capsule (500 mg total) by mouth every 6 (six) hours. 40 capsule 0    ketoconazole (NIZORAL) 2 % shampoo Apply topically twice a week. 120 mL 1     No current facility-administered medications for this visit.     Review of patient's allergies indicates:  No Known Allergies   History reviewed. No pertinent past medical history.  History reviewed. No pertinent surgical history.    Review of Systems   Constitutional:  Negative for activity change, appetite change, chills, diaphoresis, fatigue, fever and unexpected weight change.   HENT:  Negative for congestion, ear pain, sinus pressure, sore throat, trouble swallowing and voice change.    Eyes:  Negative for pain, discharge and visual disturbance.   Respiratory:  Negative for cough, chest tightness, shortness of breath and wheezing.    Cardiovascular:  Negative for chest pain and palpitations.   Gastrointestinal:  Negative for abdominal pain, constipation, diarrhea, nausea and  "vomiting.   Genitourinary:  Negative for difficulty urinating, flank pain, frequency and urgency.   Musculoskeletal:  Negative for back pain and joint swelling.   Skin:  Negative for color change and rash.   Neurological:  Negative for dizziness, seizures, syncope, weakness, numbness and headaches.   Hematological:  Negative for adenopathy.   Psychiatric/Behavioral:  Negative for dysphoric mood and sleep disturbance. The patient is not nervous/anxious.     OBJECTIVE:      Vitals:    04/05/23 0916   BP: 122/88   BP Location: Left arm   Patient Position: Sitting   BP Method: Medium (Manual)   Pulse: 69   Temp: 98.4 °F (36.9 °C)   TempSrc: Oral   SpO2: 98%   Weight: 108.5 kg (239 lb 3.2 oz)   Height: 6' 4" (1.93 m)     Physical Exam  Vitals and nursing note reviewed.   Constitutional:       General: He is awake. He is not in acute distress.     Appearance: Normal appearance. He is overweight. He is not ill-appearing, toxic-appearing or diaphoretic.   HENT:      Head: Normocephalic and atraumatic.      Nose: Nose normal.   Eyes:      General: Lids are normal. Gaze aligned appropriately.      Conjunctiva/sclera: Conjunctivae normal.      Right eye: Right conjunctiva is not injected.      Left eye: Left conjunctiva is not injected.      Pupils: Pupils are equal, round, and reactive to light.   Cardiovascular:      Rate and Rhythm: Normal rate and regular rhythm.      Pulses: Normal pulses.      Heart sounds: Normal heart sounds, S1 normal and S2 normal. No murmur heard.    No friction rub. No gallop.   Pulmonary:      Effort: Pulmonary effort is normal. No respiratory distress.      Breath sounds: Normal breath sounds. No stridor. No decreased breath sounds, wheezing, rhonchi or rales.   Chest:      Chest wall: No tenderness.   Musculoskeletal:      Cervical back: Neck supple.      Right lower leg: No edema.      Left lower leg: No edema.   Lymphadenopathy:      Cervical: No cervical adenopathy.   Skin:     General: Skin " is warm and dry.      Capillary Refill: Capillary refill takes less than 2 seconds.      Findings: No erythema or rash.   Neurological:      Mental Status: He is alert and oriented to person, place, and time. Mental status is at baseline.   Psychiatric:         Attention and Perception: Attention normal.         Mood and Affect: Mood normal.         Speech: Speech normal.         Behavior: Behavior normal. Behavior is cooperative.         Thought Content: Thought content normal.         Judgment: Judgment normal.      No visits with results within 1 Week(s) from this visit.   Latest known visit with results is:   Office Visit on 03/09/2023   Component Date Value Ref Range Status    Glucose 03/29/2023 82  70 - 99 mg/dL Final    BUN 03/29/2023 10  6 - 24 mg/dL Final    Creatinine 03/29/2023 0.80  0.76 - 1.27 mg/dL Final    eGFR 03/29/2023 109  >59 mL/min/1.73 Final    BUN/Creatinine Ratio 03/29/2023 13  9 - 20 Final    Sodium 03/29/2023 139  134 - 144 mmol/L Final    Potassium 03/29/2023 3.9  3.5 - 5.2 mmol/L Final    Chloride 03/29/2023 103  96 - 106 mmol/L Final    CO2 03/29/2023 25  20 - 29 mmol/L Final    Calcium 03/29/2023 9.3  8.7 - 10.2 mg/dL Final    Protein, Total 03/29/2023 7.7  6.0 - 8.5 g/dL Final    Albumin 03/29/2023 4.3  4.0 - 5.0 g/dL Final    Globulin, Total 03/29/2023 3.4  1.5 - 4.5 g/dL Final    Albumin/Globulin Ratio 03/29/2023 1.3  1.2 - 2.2 Final    Total Bilirubin 03/29/2023 0.8  0.0 - 1.2 mg/dL Final    Alkaline Phosphatase 03/29/2023 64  44 - 121 IU/L Final    AST 03/29/2023 35  0 - 40 IU/L Final    ALT 03/29/2023 23  0 - 44 IU/L Final    Cholesterol 03/29/2023 206 (H)  100 - 199 mg/dL Final    Triglycerides 03/29/2023 103  0 - 149 mg/dL Final    HDL 03/29/2023 60  >39 mg/dL Final    VLDL Cholesterol Fausto 03/29/2023 18  5 - 40 mg/dL Final    LDL Calculated 03/29/2023 128 (H)  0 - 99 mg/dL Final    TSH 03/29/2023 0.878  0.450 - 4.500 uIU/mL Final    Hep C Virus Ab Signal/Cutoff 03/29/2023 Non  Reactive  Non Reactive Final    Comment: HCV antibody alone does not differentiate between previously  resolved infection and active infection. Equivocal and Reactive  HCV antibody results should be followed up with an HCV RNA test  to support the diagnosis of active HCV infection.      HIV Screen 4th Generation wRfx 03/29/2023 Non Reactive  Non Reactive Final    Comment: HIV Negative  HIV-1/HIV-2 antibodies and HIV-1 p24 antigen were NOT detected.  There is no laboratory evidence of HIV infection.      Hemoglobin A1c 03/29/2023 4.9  4.8 - 5.6 % Final    Comment:          Prediabetes: 5.7 - 6.4           Diabetes: >6.4           Glycemic control for adults with diabetes: <7.0       Assessment:       1. Dyslipidemia        Plan:       Dyslipidemia  Limit red meat, butter, fried foods, cheese, and other foods that have a lot of saturated fat. Consume more: lean meats, fish, fruits, vegetables, whole grains, beans, lentils, and nuts.  Weight loss, and 30-45 min of cardiovascular exercise daily.  -     Comprehensive Metabolic Panel; Future; Expected date: 04/05/2024  -     Lipid Panel; Future; Expected date: 04/05/2024  -     TSH w/reflex to FT4; Future; Expected date: 04/05/2024    Continue home BP monitored.  BP goals of <130/90 discussed.     This note was created using [employer] voice recognition software that occasionally misinterprets phrases or words.     Follow up in about 1 year (around 4/5/2024) for Annual Physical.      4/5/2023 ERIKA Billings, FNP

## 2023-05-31 ENCOUNTER — OFFICE VISIT (OUTPATIENT)
Dept: DERMATOLOGY | Facility: CLINIC | Age: 48
End: 2023-05-31
Payer: OTHER GOVERNMENT

## 2023-05-31 VITALS — BODY MASS INDEX: 29.1 KG/M2 | HEIGHT: 76 IN | WEIGHT: 239 LBS

## 2023-05-31 DIAGNOSIS — L73.9 FOLLICULITIS: Primary | ICD-10-CM

## 2023-05-31 DIAGNOSIS — R21 FACIAL RASH: ICD-10-CM

## 2023-05-31 PROCEDURE — 99204 OFFICE O/P NEW MOD 45 MIN: CPT | Mod: S$GLB,,, | Performed by: DERMATOLOGY

## 2023-05-31 PROCEDURE — 99204 PR OFFICE/OUTPT VISIT, NEW, LEVL IV, 45-59 MIN: ICD-10-PCS | Mod: S$GLB,,, | Performed by: DERMATOLOGY

## 2023-05-31 RX ORDER — DOXYCYCLINE 100 MG/1
CAPSULE ORAL
Qty: 20 CAPSULE | Refills: 0 | Status: SHIPPED | OUTPATIENT
Start: 2023-05-31

## 2023-05-31 RX ORDER — CLINDAMYCIN PHOSPHATE 11.9 MG/ML
SOLUTION TOPICAL
Qty: 60 ML | Refills: 11 | Status: SHIPPED | OUTPATIENT
Start: 2023-05-31

## 2023-05-31 RX ORDER — HYDROCORTISONE 25 MG/ML
LOTION TOPICAL
Qty: 59 ML | Refills: 2 | Status: SHIPPED | OUTPATIENT
Start: 2023-05-31

## 2023-05-31 NOTE — PROGRESS NOTES
"  Subjective:      Patient ID:  Jagdish Hutchins is a 47 y.o. male who presents for   Chief Complaint   Patient presents with    Rash     Face and scalp     New Patient    Patient here today for rash to scalp and face, has since resolved.  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  Pt states rash presented with spots that filled with pus and water like fluid.   Used Witch hazel, alcohol, T-Tree Oil, Facial Scrubs, Black coal, adjusted diet and used Ketoconazole shampoo.   Pt "unsure" if treatment helped.  PCP gave keflex    Derm Hx:  Denies Phx of NMSC  Denies Fhx of MM    Current Outpatient Medications:   ·  cephALEXin (KEFLEX) 500 MG capsule, Take 1 capsule (500 mg total) by mouth every 6 (six) hours., Disp: 40 capsule, Rfl: 0  ·  ketoconazole (NIZORAL) 2 % shampoo, Apply topically twice a week., Disp: 120 mL, Rfl: 1      Review of Systems   Constitutional:  Negative for fever, chills and fatigue.   Skin:  Positive for rash. Negative for daily sunscreen use and activity-related sunscreen use.   Hematologic/Lymphatic: Does not bruise/bleed easily.     Objective:   Physical Exam   Constitutional: He appears well-developed and well-nourished.   Neurological: He is alert and oriented to person, place, and time.   Psychiatric: He has a normal mood and affect.   Skin:   Areas Examined (abnormalities noted in diagram):   Scalp / Hair Palpated and Inspected  Head / Face Inspection Performed          Diagram Legend     Erythematous scaling macule/papule c/w actinic keratosis       Vascular papule c/w angioma      Pigmented verrucoid papule/plaque c/w seborrheic keratosis      Yellow umbilicated papule c/w sebaceous hyperplasia      Irregularly shaped tan macule c/w lentigo     1-2 mm smooth white papules consistent with Milia      Movable " subcutaneous cyst with punctum c/w epidermal inclusion cyst      Subcutaneous movable cyst c/w pilar cyst      Firm pink to brown papule c/w dermatofibroma      Pedunculated fleshy papule(s) c/w skin tag(s)      Evenly pigmented macule c/w junctional nevus     Mildly variegated pigmented, slightly irregular-bordered macule c/w mildly atypical nevus      Flesh colored to evenly pigmented papule c/w intradermal nevus       Pink pearly papule/plaque c/w basal cell carcinoma      Erythematous hyperkeratotic cursted plaque c/w SCC      Surgical scar with no sign of skin cancer recurrence      Open and closed comedones      Inflammatory papules and pustules      Verrucoid papule consistent consistent with wart     Erythematous eczematous patches and plaques     Dystrophic onycholytic nail with subungual debris c/w onychomycosis     Umbilicated papule    Erythematous-base heme-crusted tan verrucoid plaque consistent with inflamed seborrheic keratosis     Erythematous Silvery Scaling Plaque c/w Psoriasis     See annotation                    Assessment / Plan:        Folliculitis  -     doxycycline (MONODOX) 100 MG capsule; Take 1 capsule by mouth every 12 hrs  Dispense: 20 capsule; Refill: 0  -     hydrocortisone 2.5 % lotion; Apply thin film to aa on scalp and neck daily  Dispense: 59 mL; Refill: 2  -     clindamycin (CLEOCIN T) 1 % external solution; AAA scalp and neck daily to BID  Dispense: 60 mL; Refill: 11    Facial rash  -     Ambulatory referral/consult to Dermatology    Clipper only, no close shaving  No plucking   Wears baseball caps, rotates and washes frequently  Element of pseudofolliculitis    Start benzoyl peroxide wash in shower daily, use on scalp and face, rinse well  Continue keto shampoo twice weekly    Discussed benefits and risks of doxycyline therapy including but not limited to GI discomfort, esophageal irritation/ulceration, and increased sun sensitivity. Patient was counseled to take medicine with  meals and at least 1 hour before lying down.     BENZOYL PEROXIDE WASH OVER THE COUNTER    We prefer:  - NEUTROGENA CLEAR PORE mask/cleanser  or  - CERAVE acne foaming cream cleanser   Or  - cetaphil gentle clear BPO wash  - Or panoxyl 10% wash or bar of soap  Use small pea size amount, massage on face and scalp, rinse well. This ingredient may bleach dark clothing, avoid direct contact of suds with fabrics (best used in the shower)           No follow-ups on file.

## 2023-06-12 ENCOUNTER — OFFICE VISIT (OUTPATIENT)
Dept: FAMILY MEDICINE | Facility: CLINIC | Age: 48
End: 2023-06-12
Payer: OTHER GOVERNMENT

## 2023-06-12 ENCOUNTER — HOSPITAL ENCOUNTER (OUTPATIENT)
Dept: RADIOLOGY | Facility: HOSPITAL | Age: 48
Discharge: HOME | End: 2023-06-12
Attending: NURSE PRACTITIONER
Payer: OTHER GOVERNMENT

## 2023-06-12 VITALS
BODY MASS INDEX: 28.98 KG/M2 | TEMPERATURE: 99 F | DIASTOLIC BLOOD PRESSURE: 92 MMHG | OXYGEN SATURATION: 99 % | SYSTOLIC BLOOD PRESSURE: 142 MMHG | WEIGHT: 238 LBS | HEART RATE: 80 BPM | HEIGHT: 76 IN

## 2023-06-12 DIAGNOSIS — M25.561 ACUTE PAIN OF RIGHT KNEE: ICD-10-CM

## 2023-06-12 DIAGNOSIS — M25.561 ACUTE PAIN OF RIGHT KNEE: Primary | ICD-10-CM

## 2023-06-12 PROCEDURE — 73564 X-RAY EXAM KNEE 4 OR MORE: CPT | Mod: TC,RT

## 2023-06-12 PROCEDURE — 99214 PR OFFICE/OUTPT VISIT, EST, LEVL IV, 30-39 MIN: ICD-10-PCS | Mod: S$PBB,,, | Performed by: NURSE PRACTITIONER

## 2023-06-12 PROCEDURE — 99214 OFFICE O/P EST MOD 30 MIN: CPT | Performed by: NURSE PRACTITIONER

## 2023-06-12 PROCEDURE — 99214 OFFICE O/P EST MOD 30 MIN: CPT | Mod: S$PBB,,, | Performed by: NURSE PRACTITIONER

## 2023-06-12 RX ORDER — TRAMADOL HYDROCHLORIDE 50 MG/1
50 TABLET ORAL EVERY 8 HOURS PRN
Qty: 18 EACH | Refills: 0 | Status: SHIPPED | OUTPATIENT
Start: 2023-06-12

## 2023-06-12 NOTE — PROGRESS NOTES
SUBJECTIVE:      Patient ID: Jagdish Hutchins is a 47 y.o. male.    Chief Complaint: Knee Pain (Pt states his right knee has been painful and swelling since Thursday or Friday)    47-year-old male presents to the clinic with complaints of right knee pain and swelling.  Symptoms started Friday.  Right knee pain occurred after doing yard work. Pain increased to 10/10 over the weekend.  He was unable to get out of bed Saturday due to pain. Pain is located to whole knee, anterior and posterior.  The pain is constant.  The knee pops when standing and with ROM. No recent falls.  Treating pain with ibuprofen 800 mg, heat/ice, Estuardo back and body without relief. Previous trauma to knee from falls, exercising, and lifting objects in the miliary.        Family History   Problem Relation Age of Onset    Cancer Mother     Heart disease Father       Social History     Socioeconomic History    Marital status:    Tobacco Use    Smoking status: Never    Smokeless tobacco: Never   Substance and Sexual Activity    Alcohol use: Yes     Comment: socially    Drug use: Never    Sexual activity: Yes     Partners: Female     Current Outpatient Medications   Medication Sig Dispense Refill    cephALEXin (KEFLEX) 500 MG capsule Take 1 capsule (500 mg total) by mouth every 6 (six) hours. 40 capsule 0    clindamycin (CLEOCIN T) 1 % external solution AAA scalp and neck daily to BID 60 mL 11    doxycycline (MONODOX) 100 MG capsule Take 1 capsule by mouth every 12 hrs 20 capsule 0    hydrocortisone 2.5 % lotion Apply thin film to aa on scalp and neck daily 59 mL 2    ketoconazole (NIZORAL) 2 % shampoo Apply topically twice a week. 120 mL 1    traMADoL (ULTRAM) 50 mg tablet Take 1 tablet (50 mg total) by mouth every 8 (eight) hours as needed for Pain. 18 each 0     No current facility-administered medications for this visit.     Review of patient's allergies indicates:  No Known Allergies   History reviewed. No pertinent past medical  "history.  History reviewed. No pertinent surgical history.    Review of Systems   Constitutional:  Negative for activity change, appetite change, chills, diaphoresis, fatigue, fever and unexpected weight change.   HENT:  Negative for congestion, ear pain, sinus pressure, sore throat, trouble swallowing and voice change.    Eyes:  Negative for pain, discharge and visual disturbance.   Respiratory:  Negative for cough, chest tightness, shortness of breath and wheezing.    Cardiovascular:  Negative for chest pain and palpitations.   Gastrointestinal:  Negative for abdominal pain, constipation, diarrhea, nausea and vomiting.   Genitourinary:  Negative for difficulty urinating, flank pain, frequency and urgency.   Musculoskeletal:  Positive for arthralgias. Negative for back pain, joint swelling and neck pain.        Right knee pain   Skin:  Negative for color change and rash.   Neurological:  Negative for dizziness, seizures, syncope, weakness, numbness and headaches.   Hematological:  Negative for adenopathy.   Psychiatric/Behavioral:  Negative for dysphoric mood and sleep disturbance. The patient is not nervous/anxious.     OBJECTIVE:      Vitals:    06/12/23 1624   BP: (!) 142/92   BP Location: Left arm   Patient Position: Sitting   BP Method: Medium (Manual)   Pulse: 80   Temp: 98.5 °F (36.9 °C)   TempSrc: Oral   SpO2: 99%   Weight: 108 kg (238 lb)   Height: 6' 4" (1.93 m)     Physical Exam  Vitals and nursing note reviewed.   Constitutional:       General: He is awake. He is not in acute distress.     Appearance: Normal appearance. He is overweight. He is not ill-appearing, toxic-appearing or diaphoretic.   HENT:      Head: Normocephalic and atraumatic.      Nose: Nose normal.   Eyes:      General: Lids are normal. Gaze aligned appropriately.      Conjunctiva/sclera: Conjunctivae normal.      Right eye: Right conjunctiva is not injected.      Left eye: Left conjunctiva is not injected.      Pupils: Pupils are " equal, round, and reactive to light.   Cardiovascular:      Rate and Rhythm: Normal rate and regular rhythm.      Pulses: Normal pulses.      Heart sounds: Normal heart sounds, S1 normal and S2 normal. No murmur heard.    No friction rub. No gallop.   Pulmonary:      Effort: Pulmonary effort is normal. No respiratory distress.      Breath sounds: Normal breath sounds. No stridor. No decreased breath sounds, wheezing, rhonchi or rales.   Chest:      Chest wall: No tenderness.   Musculoskeletal:      Cervical back: Neck supple.      Right knee: Swelling present. Decreased range of motion. Tenderness present over the medial joint line and lateral joint line.      Right lower leg: No edema.      Left lower leg: No edema.      Comments: Patient has tenderness to posterior knee, medial and lateral joint lines. There is some swelling, possible effusion.  No skin changes. No noticeable instability.    Lymphadenopathy:      Cervical: No cervical adenopathy.   Skin:     General: Skin is warm and dry.      Capillary Refill: Capillary refill takes less than 2 seconds.      Findings: No erythema or rash.   Neurological:      Mental Status: He is alert and oriented to person, place, and time. Mental status is at baseline.   Psychiatric:         Attention and Perception: Attention normal.         Mood and Affect: Mood normal.         Speech: Speech normal.         Behavior: Behavior normal. Behavior is cooperative.         Thought Content: Thought content normal.         Judgment: Judgment normal.      Assessment:       1. Acute pain of right knee        Plan:       Acute pain of right knee  Will start with x-rays.  Continue RICE. Ibuprofen 800 mg prn pain.  Tramadol for breakthrough pain.  Discussed PT if no significant abnormality.  If continues to have pain will refer to ortho for steroid injection.   -     X-Ray Knee Complete 4 Or More Views Right; Future; Expected date: 06/12/2023  -     traMADoL (ULTRAM) 50 mg tablet; Take 1  tablet (50 mg total) by mouth every 8 (eight) hours as needed for Pain.  Dispense: 18 each; Refill: 0    This note was created using [employer] voice recognition software that occasionally misinterprets phrases or words.     I spent a total of 20 minutes on the day of the visit.This includes face to face time and non-face to face time preparing to see the patient (eg, review of tests), obtaining and/or reviewing separately obtained history, documenting clinical information in the electronic or other health record, independently interpreting results and communicating results to the patient/family/caregiver, or care coordinator.    Follow up if symptoms worsen or fail to improve.      6/12/2023 ERIKA Billings, FNP

## 2023-06-13 ENCOUNTER — TELEPHONE (OUTPATIENT)
Dept: FAMILY MEDICINE | Facility: CLINIC | Age: 48
End: 2023-06-13

## 2023-06-13 NOTE — PROGRESS NOTES
Please call patient.  He has an effusion to his knee.  Continue rest, ice, compression, and elevation.  NSAIDs prn.  If no improvement by Friday or symptoms worsen will send to ortho.

## 2023-06-13 NOTE — TELEPHONE ENCOUNTER
----- Message from Renny Chatman NP sent at 6/13/2023  8:51 AM CDT -----  Please call patient.  He has an effusion to his knee.  Continue rest, ice, compression, and elevation.  NSAIDs prn.  If no improvement by Friday or symptoms worsen will send to ortho.

## 2023-06-20 DIAGNOSIS — M25.461 EFFUSION OF RIGHT KNEE: ICD-10-CM

## 2023-06-20 DIAGNOSIS — M25.561 ACUTE PAIN OF RIGHT KNEE: Primary | ICD-10-CM

## 2023-06-30 ENCOUNTER — OFFICE VISIT (OUTPATIENT)
Dept: ORTHOPEDICS | Facility: CLINIC | Age: 48
End: 2023-06-30
Payer: OTHER GOVERNMENT

## 2023-06-30 VITALS — WEIGHT: 238 LBS | HEIGHT: 76 IN | BODY MASS INDEX: 28.98 KG/M2

## 2023-06-30 DIAGNOSIS — S83.241A TEAR OF MEDIAL MENISCUS OF RIGHT KNEE, UNSPECIFIED TEAR TYPE, UNSPECIFIED WHETHER OLD OR CURRENT TEAR, INITIAL ENCOUNTER: Primary | ICD-10-CM

## 2023-06-30 DIAGNOSIS — M17.11 PRIMARY OSTEOARTHRITIS OF RIGHT KNEE: ICD-10-CM

## 2023-06-30 PROCEDURE — 20610 LARGE JOINT ASPIRATION/INJECTION: R KNEE: ICD-10-PCS | Mod: RT,S$GLB,, | Performed by: PHYSICIAN ASSISTANT

## 2023-06-30 PROCEDURE — 20610 DRAIN/INJ JOINT/BURSA W/O US: CPT | Mod: RT,S$GLB,, | Performed by: PHYSICIAN ASSISTANT

## 2023-06-30 PROCEDURE — 99203 PR OFFICE/OUTPT VISIT, NEW, LEVL III, 30-44 MIN: ICD-10-PCS | Mod: 25,S$GLB,, | Performed by: PHYSICIAN ASSISTANT

## 2023-06-30 PROCEDURE — 99203 OFFICE O/P NEW LOW 30 MIN: CPT | Mod: 25,S$GLB,, | Performed by: PHYSICIAN ASSISTANT

## 2023-06-30 RX ORDER — ASPIRIN/CAFFEINE 500-32.5MG
TABLET ORAL
COMMUNITY

## 2023-06-30 RX ORDER — IBUPROFEN 200 MG
200 TABLET ORAL EVERY 6 HOURS PRN
COMMUNITY

## 2023-06-30 RX ORDER — TRIAMCINOLONE ACETONIDE 40 MG/ML
40 INJECTION, SUSPENSION INTRA-ARTICULAR; INTRAMUSCULAR
Status: DISCONTINUED | OUTPATIENT
Start: 2023-06-30 | End: 2023-06-30 | Stop reason: HOSPADM

## 2023-06-30 RX ADMIN — TRIAMCINOLONE ACETONIDE 40 MG: 40 INJECTION, SUSPENSION INTRA-ARTICULAR; INTRAMUSCULAR at 12:06

## 2023-06-30 NOTE — PROCEDURES
Large Joint Aspiration/Injection: R knee    Date/Time: 6/30/2023 12:15 PM  Performed by: Gunnar Nugent PA-C  Authorized by: Gunnar Nugent PA-C     Consent Done?:  Yes (Verbal)  Indications:  Pain and arthritis  Site marked: the procedure site was marked    Timeout: prior to procedure the correct patient, procedure, and site was verified    Prep: patient was prepped and draped in usual sterile fashion      Local anesthesia used?: Yes    Local anesthetic:  Lidocaine 1% without epinephrine    Details:  Needle Size:  25 G  Ultrasonic Guidance for needle placement?: No    Location:  Knee  Site:  R knee  Medications:  40 mg triamcinolone acetonide 40 mg/mL  Patient tolerance:  Patient tolerated the procedure well with no immediate complications

## 2023-06-30 NOTE — PROGRESS NOTES
[facility]  [address]  [place] [address]  Phone: [phone]   [phone]    Patient's PCP: Renny Chatman NP  Referring Provider: Renny Lazo*    Subjective:      Chief Complaint:   Chief Complaint   Patient presents with    Right Knee - Pain     Patient is here with complaints of Right knee pain x 1 month, Swollen painful both lateral and medial sides. No known injury. Did PT in 7/2022 for the knee, un sure if it helped, feels like it will give away       Past Medical History:   Diagnosis Date    A-fib 2017    Sleep apnea, unspecified 2017       Past Surgical History:   Procedure Laterality Date    cardioeversion  2017       Current Outpatient Medications   Medication Sig    aspirin-caffeine (TITA BACK AND BODY) 500-32.5 mg Tab Take by mouth.    ibuprofen (ADVIL,MOTRIN) 200 MG tablet Take 200 mg by mouth every 6 (six) hours as needed for Pain.    ketoconazole (NIZORAL) 2 % shampoo Apply topically twice a week.    traMADoL (ULTRAM) 50 mg tablet Take 1 tablet (50 mg total) by mouth every 8 (eight) hours as needed for Pain.    cephALEXin (KEFLEX) 500 MG capsule Take 1 capsule (500 mg total) by mouth every 6 (six) hours. (Patient not taking: Reported on 6/30/2023)    clindamycin (CLEOCIN T) 1 % external solution AAA scalp and neck daily to BID (Patient not taking: Reported on 6/30/2023)    doxycycline (MONODOX) 100 MG capsule Take 1 capsule by mouth every 12 hrs (Patient not taking: Reported on 6/30/2023)    hydrocortisone 2.5 % lotion Apply thin film to aa on scalp and neck daily (Patient not taking: Reported on 6/30/2023)     No current facility-administered medications for this visit.       Review of patient's allergies indicates:   Allergen Reactions    Grass pollen-june grass standard Dermatitis, Shortness Of Breath and Swelling    House dust Dermatitis, Shortness Of Breath and Swelling       Family History   Problem Relation Age of Onset    Cancer Mother      Heart disease Father        Social History     Socioeconomic History    Marital status:    Tobacco Use    Smoking status: Never    Smokeless tobacco: Never   Substance and Sexual Activity    Alcohol use: Yes     Comment: socially    Drug use: Never    Sexual activity: Yes     Partners: Female       History of present illness: Jagdish comes to the office today as a new patient chief complaint of right knee pain and swelling.  He is had right knee issues for about 1-2 years.  He is not recollect any specific injury or trauma.  Is a prior service [employer] duty [employer] for 30 years.  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  Over the past month, the pain has become more severe and frequent.  He is even begun to experience swelling.  He does not recollect any recent injury or trauma causing this exacerbation.    Review of Systems:    Constitutional: Negative for chills, fever and weight loss.   HENT: Negative for congestion.    Eyes: Negative for discharge and redness.   Respiratory: Negative for cough and shortness of breath.    Cardiovascular: Negative for chest pain.   Gastrointestinal: Negative for nausea and vomiting.   Musculoskeletal: See HPI.   Skin: Negative for rash.   Neurological: Negative for headaches.   Endo/Heme/Allergies: Does not bruise/bleed easily.   Psychiatric/Behavioral: The patient is not nervous/anxious.    All other systems reviewed and are negative.       Objective:      Physical Examination:    Vital Signs:  There were no vitals filed for this visit.    Body mass index is 28.97 kg/m².    This a well-developed, well nourished patient in no acute distress.  They are alert and oriented and cooperative to examination.     Right knee exam: Skin to right knee is clean dry and intact.  There is no erythema or ecchymosis.  There are no signs or symptoms of infection.  He has a 1+ effusion.  Right knee range of motion 0-100 degrees.  It is stable to varus and valgus stresses.   Does have some medial and lateral joint line tenderness.  He is more tender medially versus laterally.  He has a negative straight leg raise on the right.  He can weightbear as tolerated on the right lower extremity but has an antalgic gait.    Pertinent New Results:        XRAY Report / Interpretation:   A single weight-bearing view was taken of the right knee today.  This was viewed in conjunction with various other views of the knee taken by his primary care provider.  He does have moderate degenerative changes tricompartmentally in the right knee.  He also has an effusion noted on radiographs.  Visualized soft tissues otherwise appear unremarkable.      Assessment:       1. Tear of medial meniscus of right knee, unspecified tear type, unspecified whether old or current tear, initial encounter    2. Primary osteoarthritis of right knee      Plan:     Tear of medial meniscus of right knee, unspecified tear type, unspecified whether old or current tear, initial encounter  -     X-Ray Knee AP Standing Bilateral  -     Ambulatory referral/consult to Orthopedics  -     Large Joint Aspiration/Injection: R knee    Primary osteoarthritis of right knee  -     Large Joint Aspiration/Injection: R knee        Follow up in about 6 weeks (around 8/11/2023) for Injec. f/up Right knee 6/30/23, Friday w/Boby.    I injected his right knee today via an anterior lateral approach with 40 mg of Kenalog and lidocaine.  He tolerated this well.  There is a good chance he may have a degenerative medial meniscus tear as well.  I would like to check him back in 6 weeks to see how responds to today's injection.  If he is not drastically improved or continues to have any symptoms of instability or giving way, I would likely proceed with ordering an MRI of the right knee to evaluate for any internal derangement.  We will reassess him in 6 weeks to determine the need for advanced imaging.        Gunnar Nugent, NATALYS, PA-C    This note was  created using [employer] voice recognition software that occasionally misinterprets words or phrases.

## 2023-08-11 ENCOUNTER — OFFICE VISIT (OUTPATIENT)
Dept: ORTHOPEDICS | Facility: CLINIC | Age: 48
End: 2023-08-11
Payer: OTHER GOVERNMENT

## 2023-08-11 VITALS — BODY MASS INDEX: 28.98 KG/M2 | HEIGHT: 76 IN | WEIGHT: 238 LBS

## 2023-08-11 DIAGNOSIS — M23.203 OTHER OLD TEAR OF MEDIAL MENISCUS OF RIGHT KNEE: Primary | ICD-10-CM

## 2023-08-11 PROCEDURE — 99213 OFFICE O/P EST LOW 20 MIN: CPT | Mod: S$GLB,,, | Performed by: PHYSICIAN ASSISTANT

## 2023-08-11 PROCEDURE — 99213 PR OFFICE/OUTPT VISIT, EST, LEVL III, 20-29 MIN: ICD-10-PCS | Mod: S$GLB,,, | Performed by: PHYSICIAN ASSISTANT

## 2023-08-11 NOTE — PROGRESS NOTES
[facility]  [address]  [place] [address]  Phone: [phone]   [phone]    Patient's PCP: Renny Chatman NP  Referring Provider: No ref. provider found    Subjective:      Chief Complaint:   Chief Complaint   Patient presents with    Right Knee - Pain     Right knee pain follow up. Received inj 06/30/23 which offered relief. States he does still have pain and continues to feel unstable       Past Medical History:   Diagnosis Date    A-fib 2017    Sleep apnea, unspecified 2017       Past Surgical History:   Procedure Laterality Date    cardioeversion  2017       Current Outpatient Medications   Medication Sig    aspirin-caffeine (TITA BACK AND BODY) 500-32.5 mg Tab Take by mouth.    ibuprofen (ADVIL,MOTRIN) 200 MG tablet Take 200 mg by mouth every 6 (six) hours as needed for Pain.    ketoconazole (NIZORAL) 2 % shampoo Apply topically twice a week.    cephALEXin (KEFLEX) 500 MG capsule Take 1 capsule (500 mg total) by mouth every 6 (six) hours. (Patient not taking: Reported on 6/30/2023)    clindamycin (CLEOCIN T) 1 % external solution AAA scalp and neck daily to BID (Patient not taking: Reported on 6/30/2023)    doxycycline (MONODOX) 100 MG capsule Take 1 capsule by mouth every 12 hrs (Patient not taking: Reported on 6/30/2023)    hydrocortisone 2.5 % lotion Apply thin film to aa on scalp and neck daily (Patient not taking: Reported on 6/30/2023)    traMADoL (ULTRAM) 50 mg tablet Take 1 tablet (50 mg total) by mouth every 8 (eight) hours as needed for Pain. (Patient not taking: Reported on 8/11/2023)     No current facility-administered medications for this visit.       Review of patient's allergies indicates:   Allergen Reactions    Grass pollen-june grass standard Dermatitis, Shortness Of Breath and Swelling    House dust Dermatitis, Shortness Of Breath and Swelling       Family History   Problem Relation Age of Onset    Cancer Mother     Heart disease Father         Social History     Socioeconomic History    Marital status:    Tobacco Use    Smoking status: Never    Smokeless tobacco: Never   Substance and Sexual Activity    Alcohol use: Yes     Comment: socially    Drug use: Never    Sexual activity: Yes     Partners: Female       Prior to meeting with the patient I reviewed the medical chart in [facility]. This included reviewing the previous progress notes from our office, review of the patient's last appointment with their primary care provider, review of any visits to the emergency room, and review of any pain management appointments or procedures.    History of present illness:  Jagdish comes in today for follow-up for his right knee.  He did get good relief of his pain with the injection about 6 weeks ago.  Unfortunately, he continues with feelings of instability in the knee wanting to buckle and give way.  It does interfere with some of his activities of daily living with these symptoms.    Review of Systems:    Constitutional: Negative for chills, fever and weight loss.   HENT: Negative for congestion.    Eyes: Negative for discharge and redness.   Respiratory: Negative for cough and shortness of breath.    Cardiovascular: Negative for chest pain.   Gastrointestinal: Negative for nausea and vomiting.   Musculoskeletal: See HPI.   Skin: Negative for rash.   Neurological: Negative for headaches.   Endo/Heme/Allergies: Does not bruise/bleed easily.   Psychiatric/Behavioral: The patient is not nervous/anxious.    All other systems reviewed and are negative.       Objective:      Physical Examination:    Vital Signs:  There were no vitals filed for this visit.    Body mass index is 28.97 kg/m².    This a well-developed, well nourished patient in no acute distress.  They are alert and oriented and cooperative to examination.     Right knee exam:  Right knee exam is similar to where he was on initial presentation about 6 weeks ago. Skin to right knee is clean dry and  intact.  There is no erythema or ecchymosis.  There are no signs or symptoms of infection.  He has a 1+ effusion.  Right knee range of motion 0-100 degrees.  It is stable to varus and valgus stresses.  Does have medial joint line tenderness.  He is more tender medially versus laterally.  He has a negative straight leg raise on the right.  He can weightbear as tolerated on the right lower extremity but has an antalgic gait.  He does have a positive Maggie's for reproduction of pain along the medial aspect of the right knee but no palpable pop or click.    Pertinent New Results:        XRAY Report / Interpretation:   No new radiographs were taken on today's clinic visit.      Assessment:       1. Other old tear of medial meniscus of right knee      Plan:     Other old tear of medial meniscus of right knee  -     MRI Knee Without Contrast Right; Future; Expected date: 08/11/2023        Follow up for Tues/Thurs, MRI Results right knee.    Believe the patient likely has a medial meniscus tear in the left knee.  He continues to be symptomatic even after an injection in oral anti-inflammatories.  Also been following some activity modification limiting his activities to reduce any flare-ups.  Unfortunately, this has not proven to be efficacious.  We will order an MRI of the left knee and have him follow-up with that information and make further orthopedic treatment recommendations from there.    BECKI Estrada, PA-C    This note was created using [employer] voice recognition software that occasionally misinterprets words or phrases.

## 2023-08-22 ENCOUNTER — HOSPITAL ENCOUNTER (OUTPATIENT)
Dept: RADIOLOGY | Facility: HOSPITAL | Age: 48
Discharge: HOME | End: 2023-08-22
Attending: PHYSICIAN ASSISTANT
Payer: OTHER GOVERNMENT

## 2023-08-22 DIAGNOSIS — M23.203 OTHER OLD TEAR OF MEDIAL MENISCUS OF RIGHT KNEE: ICD-10-CM

## 2023-08-22 PROCEDURE — 73721 MRI JNT OF LWR EXTRE W/O DYE: CPT | Mod: 26,RT,, | Performed by: RADIOLOGY

## 2023-08-22 PROCEDURE — 73721 MRI KNEE WITHOUT CONTRAST RIGHT: ICD-10-PCS | Mod: 26,RT,, | Performed by: RADIOLOGY

## 2023-08-22 PROCEDURE — 73721 MRI JNT OF LWR EXTRE W/O DYE: CPT | Mod: TC,RT

## 2023-09-05 ENCOUNTER — OFFICE VISIT (OUTPATIENT)
Dept: ORTHOPEDICS | Facility: CLINIC | Age: 48
End: 2023-09-05
Payer: OTHER GOVERNMENT

## 2023-09-05 VITALS — WEIGHT: 238 LBS | BODY MASS INDEX: 28.98 KG/M2 | HEIGHT: 76 IN

## 2023-09-05 DIAGNOSIS — M17.11 PRIMARY OSTEOARTHRITIS OF RIGHT KNEE: ICD-10-CM

## 2023-09-05 DIAGNOSIS — M23.203 OTHER OLD TEAR OF MEDIAL MENISCUS OF RIGHT KNEE: Primary | ICD-10-CM

## 2023-09-05 PROCEDURE — 99213 PR OFFICE/OUTPT VISIT, EST, LEVL III, 20-29 MIN: ICD-10-PCS | Mod: S$GLB,,, | Performed by: ORTHOPAEDIC SURGERY

## 2023-09-05 PROCEDURE — 99213 OFFICE O/P EST LOW 20 MIN: CPT | Mod: S$GLB,,, | Performed by: ORTHOPAEDIC SURGERY

## 2023-09-05 NOTE — PROGRESS NOTES
[facility]    Subjective:     Chief Complaint: No chief complaint on file.      Past Medical History:   Diagnosis Date    A-fib 2017    Sleep apnea, unspecified 2017       Past Surgical History:   Procedure Laterality Date    cardioeversion  2017       Current Outpatient Medications   Medication Sig    aspirin-caffeine (TITA BACK AND BODY) 500-32.5 mg Tab Take by mouth.    ibuprofen (ADVIL,MOTRIN) 200 MG tablet Take 200 mg by mouth every 6 (six) hours as needed for Pain.    ketoconazole (NIZORAL) 2 % shampoo Apply topically twice a week.    cephALEXin (KEFLEX) 500 MG capsule Take 1 capsule (500 mg total) by mouth every 6 (six) hours. (Patient not taking: Reported on 6/30/2023)    clindamycin (CLEOCIN T) 1 % external solution AAA scalp and neck daily to BID (Patient not taking: Reported on 6/30/2023)    doxycycline (MONODOX) 100 MG capsule Take 1 capsule by mouth every 12 hrs (Patient not taking: Reported on 6/30/2023)    hydrocortisone 2.5 % lotion Apply thin film to aa on scalp and neck daily (Patient not taking: Reported on 6/30/2023)    traMADoL (ULTRAM) 50 mg tablet Take 1 tablet (50 mg total) by mouth every 8 (eight) hours as needed for Pain. (Patient not taking: Reported on 8/11/2023)     No current facility-administered medications for this visit.       Review of patient's allergies indicates:   Allergen Reactions    Grass pollen-june grass standard Dermatitis, Shortness Of Breath and Swelling    House dust Dermatitis, Shortness Of Breath and Swelling       Family History   Problem Relation Age of Onset    Cancer Mother     Heart disease Father        Social History     Socioeconomic History    Marital status:    Tobacco Use    Smoking status: Never    Smokeless tobacco: Never   Substance and Sexual Activity    Alcohol use: Yes     Comment: socially    Drug use: Never    Sexual activity: Yes     Partners: Female       History of present illness:  Comes in today for follow-up for the right  knee.  He has had his MRI.  He comes in today with those results.  He is still continues with medial knee pain and some feelings of instability and the knee wanting to buckle and give way.    Review of Systems:    Constitution: Negative for chills, fever, and sweats.  Negative for unexplained weight loss.    HENT:  Negative for headaches and blurry vision.    Cardiovascular:Negative for chest pain or irregular heart beat. Negative for hypertension.    Respiratory:  Negative for cough and shortness of breath.    Gastrointestinal: Negative for abdominal pain, heartburn, melena, nausea, and vomitting.    Genitourinary:  Negative bladder incontinence and dysuria.    Musculoskeletal:  See HPI for details.     Neurological: Negative for numbness.    Psychiatric/Behavioral: Negative for depression.  The patient is not nervous/anxious.      Endocrine: Negative for polyuria    Hematologic/Lymphatic: Negative for bleeding problem.  Does not bruise/bleed easily.    Skin: Negative for poor would healing and rash    Objective:      Physical Examination:    Vital Signs:  There were no vitals filed for this visit.    Body mass index is 28.97 kg/m².    This a well-developed, well nourished patient in no acute distress.  They are alert and oriented and cooperative to examination.        Right knee exam:  Skin to the right knee is clean dry and intact. There is no erythema or ecchymosis.  There are no signs or symptoms of infection.  He has a 1+ effusion.  Right knee range of motion 0-100 degrees.  It is stable to varus and valgus stresses.  Does have medial joint line tenderness.  He is more tender medially versus laterally.  He has a negative straight leg raise on the right.  He can weightbear as tolerated on the right lower extremity but has an antalgic gait.  He does have a positive Maggie's for reproduction of pain along the medial aspect of the right knee but no palpable pop or click.    Pertinent New Results:    XRAY Report  "/ Interpretation:   No new radiographs were taken on today's clinic visit.  However did review images report of his right knee MRI which was significant for a tear of the medial meniscus at the junction of the posterior horn in the body.  Does have chondral fissuring on the medial facet of the patella.    Assessment/Plan:      1. Right knee medial meniscus tear.      Risks and benefits of proceeding with right knee arthroscopy with partial medial meniscectomy were discussed with him today in detail.  Of his questions were answered.  He clearly understood and wished to proceed.  Surgical consents were obtained today.  We discussed the outpatient nature of the procedure.    Risks of the procedure were reviewed with the patient.  This includes, but is not limited to: infection, bleeding, pain, swelling, decreased range of motion, nerve injury/damage, deep vein thrombosis, pulmonary embolism, wound dehiscence, and possible need for further surgery.    Gunnar Nugent, Physician Assistant, served in the capacity as a "scribe" for this patient encounter.  A "face-to-face" encounter occurred with Dr. Tip Young on this date.  The treatment plan and medical decision-making is outlined above. Patient was seen and examined with a chaperone.        This note was created using Dragon voice recognition software that occasionally misinterpreted phrases or words.          "

## 2024-03-12 ENCOUNTER — PATIENT MESSAGE (OUTPATIENT)
Dept: ADMINISTRATIVE | Facility: HOSPITAL | Age: 49
End: 2024-03-12
Payer: OTHER GOVERNMENT

## 2024-04-08 ENCOUNTER — OFFICE VISIT (OUTPATIENT)
Dept: FAMILY MEDICINE | Facility: CLINIC | Age: 49
End: 2024-04-08
Payer: OTHER GOVERNMENT

## 2024-04-08 VITALS
TEMPERATURE: 98 F | BODY MASS INDEX: 28.25 KG/M2 | SYSTOLIC BLOOD PRESSURE: 130 MMHG | HEIGHT: 76 IN | DIASTOLIC BLOOD PRESSURE: 84 MMHG | WEIGHT: 232 LBS | OXYGEN SATURATION: 99 % | HEART RATE: 74 BPM | RESPIRATION RATE: 18 BRPM

## 2024-04-08 DIAGNOSIS — M54.50 ACUTE LEFT-SIDED LOW BACK PAIN, UNSPECIFIED WHETHER SCIATICA PRESENT: ICD-10-CM

## 2024-04-08 DIAGNOSIS — Z00.00 ANNUAL PHYSICAL EXAM: Primary | ICD-10-CM

## 2024-04-08 DIAGNOSIS — E78.5 DYSLIPIDEMIA: ICD-10-CM

## 2024-04-08 PROCEDURE — 99396 PREV VISIT EST AGE 40-64: CPT | Mod: S$PBB,,, | Performed by: NURSE PRACTITIONER

## 2024-04-08 PROCEDURE — 99214 OFFICE O/P EST MOD 30 MIN: CPT | Mod: PBBFAC,PN | Performed by: NURSE PRACTITIONER

## 2024-04-08 PROCEDURE — 99999 PR PBB SHADOW E&M-EST. PATIENT-LVL IV: CPT | Mod: PBBFAC,,, | Performed by: NURSE PRACTITIONER

## 2024-04-08 RX ORDER — CYCLOBENZAPRINE HCL 10 MG
10 TABLET ORAL NIGHTLY
Qty: 14 TABLET | Refills: 0 | Status: SHIPPED | OUTPATIENT
Start: 2024-04-08

## 2024-04-08 NOTE — PROGRESS NOTES
SUBJECTIVE:      Patient ID: Jagdish Hutchins is a 48 y.o. male.    Chief Complaint: Annual Exam    48-year-old male presents to the clinic for an annual exam.  Past medical history significant for AFib and sleep apnea.  He does not take any prescription medications.  He has a nonsmoker.  Alcohol consumption is socially.  Denies illicit drug use.  He has been doing well, but is having some lower back pain x2 months.  Pain is the the left lower back. Initially felt a pop when it occurred, denies injury/trauma. The pain initially radiated to left leg, but has improved.  He has been doing stretching exercises. Blood pressure is stable. He is slightly over weight. He exercises 4 days per week at the gym x1 hour. No longer wearing CPAP, cannot tolerate the mask on his face. Overdue healthcare maintenance reviewed. Colon cancer screening completed 2-3 years ago via colonoscopy, things it was performed by Dr. Gaona.         Family History   Problem Relation Age of Onset    Cancer Mother     Heart disease Father       Social History     Socioeconomic History    Marital status:    Tobacco Use    Smoking status: Never    Smokeless tobacco: Never   Substance and Sexual Activity    Alcohol use: Yes     Comment: socially    Drug use: Never    Sexual activity: Yes     Partners: Female     Current Outpatient Medications   Medication Sig Dispense Refill    aspirin-caffeine (TITA BACK AND BODY) 500-32.5 mg Tab Take by mouth.      ibuprofen (ADVIL,MOTRIN) 200 MG tablet Take 200 mg by mouth every 6 (six) hours as needed for Pain.      cyclobenzaprine (FLEXERIL) 10 MG tablet Take 1 tablet (10 mg total) by mouth every evening. 14 tablet 0     No current facility-administered medications for this visit.     Review of patient's allergies indicates:   Allergen Reactions    Grass pollen-june grass standard Dermatitis, Shortness Of Breath and Swelling    House dust Dermatitis, Shortness Of Breath and Swelling      Past Medical  "History:   Diagnosis Date    A-fib 2017    Sleep apnea, unspecified 2017     Past Surgical History:   Procedure Laterality Date    cardioeversion  2017       Review of Systems   Constitutional:  Negative for activity change, appetite change, chills, diaphoresis, fatigue, fever and unexpected weight change.   HENT:  Negative for congestion, ear pain, sinus pressure, sore throat, trouble swallowing and voice change.    Eyes:  Negative for pain, discharge and visual disturbance.   Respiratory:  Negative for cough, chest tightness, shortness of breath and wheezing.    Cardiovascular:  Negative for chest pain and palpitations.   Gastrointestinal:  Negative for abdominal pain, constipation, diarrhea, nausea and vomiting.   Genitourinary:  Negative for difficulty urinating, flank pain, frequency and urgency.   Musculoskeletal:  Positive for back pain. Negative for joint swelling.   Skin:  Negative for color change and rash.   Neurological:  Negative for dizziness, seizures, syncope, weakness, numbness and headaches.   Hematological:  Negative for adenopathy.   Psychiatric/Behavioral:  Negative for dysphoric mood and sleep disturbance. The patient is not nervous/anxious.       OBJECTIVE:      Vitals:    04/08/24 1453   BP: 130/84   BP Location: Left arm   Patient Position: Sitting   BP Method: Medium (Manual)   Pulse: 74   Resp: 18   Temp: 98.4 °F (36.9 °C)   TempSrc: Oral   SpO2: 99%   Weight: 105.2 kg (232 lb)   Height: 6' 4" (1.93 m)     Physical Exam  Vitals and nursing note reviewed.   Constitutional:       General: He is awake. He is not in acute distress.     Appearance: He is well-developed, well-groomed and overweight. He is not ill-appearing, toxic-appearing or diaphoretic.   HENT:      Head: Normocephalic and atraumatic.      Right Ear: Tympanic membrane, ear canal and external ear normal.      Left Ear: Tympanic membrane, ear canal and external ear normal.      Nose: Nose normal.      Mouth/Throat:      Lips: " Pink.      Mouth: Mucous membranes are moist.      Dentition: No dental tenderness.      Tongue: Tongue does not deviate from midline.      Pharynx: Oropharynx is clear. Uvula midline. No pharyngeal swelling, oropharyngeal exudate, posterior oropharyngeal erythema or uvula swelling.   Eyes:      General: Lids are normal. Gaze aligned appropriately.      Conjunctiva/sclera: Conjunctivae normal.      Right eye: Right conjunctiva is not injected.      Left eye: Left conjunctiva is not injected.      Pupils: Pupils are equal, round, and reactive to light.   Neck:      Thyroid: No thyromegaly or thyroid tenderness.   Cardiovascular:      Rate and Rhythm: Normal rate and regular rhythm.      Pulses: Normal pulses.      Heart sounds: Normal heart sounds, S1 normal and S2 normal. No murmur heard.     No friction rub. No gallop.   Pulmonary:      Effort: Pulmonary effort is normal. No respiratory distress.      Breath sounds: Normal breath sounds. No stridor. No decreased breath sounds, wheezing, rhonchi or rales.   Chest:      Chest wall: No tenderness.   Abdominal:      General: There is no distension.      Palpations: Abdomen is soft.      Tenderness: There is no abdominal tenderness. There is no guarding or rebound.   Musculoskeletal:      Cervical back: Neck supple.      Right lower leg: No edema.      Left lower leg: No edema.   Lymphadenopathy:      Cervical: No cervical adenopathy.   Skin:     General: Skin is warm and dry.      Capillary Refill: Capillary refill takes less than 2 seconds.      Findings: No erythema or rash.   Neurological:      Mental Status: He is alert and oriented to person, place, and time. Mental status is at baseline.   Psychiatric:         Attention and Perception: Attention normal.         Mood and Affect: Mood normal.         Speech: Speech normal.         Behavior: Behavior normal. Behavior is cooperative.         Thought Content: Thought content normal.         Judgment: Judgment normal.         Assessment:       1. Annual physical exam    2. Dyslipidemia    3. Acute left-sided low back pain, unspecified whether sciatica present        Plan:       Annual physical exam  Counseled on age and gender appropriate medical preventative services, including cancer screenings, immunizations, overall nutritional health, need for a consistent exercise regimen and an overall push towards maintaining a vigorous and active lifestyle.    -     Comprehensive Metabolic Panel; Future; Expected date: 04/08/2024  -     Lipid Panel; Future; Expected date: 04/08/2024  -     TSH; Future; Expected date: 04/08/2024    Dyslipidemia  Limit red meat, butter, fried foods, cheese, and other foods that have a lot of saturated fat. Consume more: lean meats, fish, fruits, vegetables, whole grains, beans, lentils, and nuts.  Weight loss, and 30-45 min of cardiovascular exercise daily.  -     Comprehensive Metabolic Panel; Future; Expected date: 04/08/2024  -     Lipid Panel; Future; Expected date: 04/08/2024  -     TSH; Future; Expected date: 04/08/2024    Acute left-sided low back pain, unspecified whether sciatica present  Recommend NSAIDs prn, Flexeril 10 mg qhs. Continue stretching exercises. If no improvement discussed x-rays and may need referral to PT.   -     cyclobenzaprine (FLEXERIL) 10 MG tablet; Take 1 tablet (10 mg total) by mouth every evening.  Dispense: 14 tablet; Refill: 0    This note was created using [employer] voice recognition software that occasionally misinterprets phrases or words.     I spent a total of 31 minutes on the day of the visit.This includes face to face time and non-face to face time preparing to see the patient (eg, review of tests), obtaining and/or reviewing separately obtained history, documenting clinical information in the electronic or other health record, independently interpreting results and communicating results to the patient/family/caregiver, or care coordinator.    Follow up in about 1  year (around 4/8/2025) for Annual Physical.          4/8/2024 Renny Chatman, ERIKA, FNP

## 2024-08-02 ENCOUNTER — PATIENT MESSAGE (OUTPATIENT)
Dept: ADMINISTRATIVE | Facility: HOSPITAL | Age: 49
End: 2024-08-02
Payer: OTHER GOVERNMENT

## 2024-10-16 ENCOUNTER — PATIENT MESSAGE (OUTPATIENT)
Dept: FAMILY MEDICINE | Facility: CLINIC | Age: 49
End: 2024-10-16
Payer: OTHER GOVERNMENT

## 2025-01-14 ENCOUNTER — PATIENT MESSAGE (OUTPATIENT)
Dept: ADMINISTRATIVE | Facility: HOSPITAL | Age: 50
End: 2025-01-14
Payer: OTHER GOVERNMENT

## 2025-01-29 ENCOUNTER — OFFICE VISIT (OUTPATIENT)
Dept: FAMILY MEDICINE | Facility: CLINIC | Age: 50
End: 2025-01-29
Payer: OTHER GOVERNMENT

## 2025-01-29 ENCOUNTER — HOSPITAL ENCOUNTER (OUTPATIENT)
Dept: RADIOLOGY | Facility: HOSPITAL | Age: 50
Discharge: HOME | End: 2025-01-29
Attending: NURSE PRACTITIONER
Payer: OTHER GOVERNMENT

## 2025-01-29 VITALS
DIASTOLIC BLOOD PRESSURE: 90 MMHG | OXYGEN SATURATION: 97 % | HEART RATE: 83 BPM | BODY MASS INDEX: 29.41 KG/M2 | TEMPERATURE: 98 F | HEIGHT: 76 IN | WEIGHT: 241.5 LBS | SYSTOLIC BLOOD PRESSURE: 130 MMHG

## 2025-01-29 DIAGNOSIS — R00.2 PALPITATIONS: ICD-10-CM

## 2025-01-29 DIAGNOSIS — I48.0 PAROXYSMAL ATRIAL FIBRILLATION: Primary | ICD-10-CM

## 2025-01-29 DIAGNOSIS — M25.512 CHRONIC LEFT SHOULDER PAIN: ICD-10-CM

## 2025-01-29 DIAGNOSIS — E78.49 OTHER HYPERLIPIDEMIA: ICD-10-CM

## 2025-01-29 DIAGNOSIS — Z12.5 SCREENING FOR PROSTATE CANCER: ICD-10-CM

## 2025-01-29 DIAGNOSIS — G89.29 CHRONIC LEFT SHOULDER PAIN: ICD-10-CM

## 2025-01-29 DIAGNOSIS — M53.3 PAIN IN THE COCCYX: ICD-10-CM

## 2025-01-29 DIAGNOSIS — B35.3 TINEA PEDIS OF BOTH FEET: ICD-10-CM

## 2025-01-29 DIAGNOSIS — L03.011 CHRONIC PARONYCHIA OF FINGER OF RIGHT HAND: ICD-10-CM

## 2025-01-29 PROCEDURE — 99215 OFFICE O/P EST HI 40 MIN: CPT | Mod: PBBFAC,PN | Performed by: NURSE PRACTITIONER

## 2025-01-29 PROCEDURE — 99214 OFFICE O/P EST MOD 30 MIN: CPT | Mod: S$PBB,,, | Performed by: NURSE PRACTITIONER

## 2025-01-29 PROCEDURE — 72220 X-RAY EXAM SACRUM TAILBONE: CPT | Mod: TC

## 2025-01-29 PROCEDURE — 73030 X-RAY EXAM OF SHOULDER: CPT | Mod: TC,LT

## 2025-01-29 PROCEDURE — 73030 X-RAY EXAM OF SHOULDER: CPT | Mod: 26,LT,, | Performed by: RADIOLOGY

## 2025-01-29 PROCEDURE — 72220 X-RAY EXAM SACRUM TAILBONE: CPT | Mod: 26,,, | Performed by: RADIOLOGY

## 2025-01-29 PROCEDURE — 99999 PR PBB SHADOW E&M-EST. PATIENT-LVL V: CPT | Mod: PBBFAC,,, | Performed by: NURSE PRACTITIONER

## 2025-01-29 PROCEDURE — G2211 COMPLEX E/M VISIT ADD ON: HCPCS | Mod: 95,S$PBB,, | Performed by: NURSE PRACTITIONER

## 2025-01-29 RX ORDER — PRENATAL VIT 91/IRON/FOLIC/DHA 28-975-200
COMBINATION PACKAGE (EA) ORAL 2 TIMES DAILY
Qty: 28 G | Refills: 3 | Status: SHIPPED | OUTPATIENT
Start: 2025-01-29 | End: 2025-02-26

## 2025-01-29 RX ORDER — NAPROXEN 500 MG/1
500 TABLET ORAL 2 TIMES DAILY
Qty: 28 TABLET | Refills: 0 | Status: SHIPPED | OUTPATIENT
Start: 2025-01-29

## 2025-01-29 NOTE — PROGRESS NOTES
"    SUBJECTIVE:      Patient ID: Jagdish Hutchins is a 49 y.o. male.    Chief Complaint: Tailbone Pain (Chronic ), Shoulder Pain (Left. Onset two months), athlete's feet (Onset 3 weeks), and Palpitations (St it has been off and on for 3-4 months )    History of Present Illness    CHIEF COMPLAINT:  Mr. Hutchins presents with multiple concerns including left shoulder pain, tailbone pain, athlete's foot, a painful finger issue, and palpitations.    HPI:  Mr. Hutchins reports left shoulder pain present for a few months, with a sensation of shoulder instability when walking requiring repositioning. He denies any injury, trauma, or heavy lifting related to the shoulder pain. Mr. Hutchins engages in light weightlifting for general fitness.    Mr. Hutchins complains of recurrent tailbone pain. He has sharp, pin-like pain in the tailbone area when sitting for 5-10 minutes, necessitating leaning forward to alleviate pressure. This issue has a history prior to correction, with periods of remission and recurrence.    Mr. Hutchins reports symptoms of athlete's foot on both feet, including itching and presence of white substance between the toes. He mentions prior use of a treatment called "Formex."    Mr. Hutchins describes a painful finger issue on his right hand persisting for about 6 months. The affected finger feels hot to touch and is tender, particularly when pulled. He speculates about a possible infection or foreign body from woodworking, though he does not recall a specific injury.    Mr. Hutchins mentions palpitations. He was diagnosed with atrial fibrillation (AFib) 2 years ago and underwent cardioversion. He reports intermittent tachycardia and irregular rhythms, as well as occasional shortness of breath. Mr. Hutchins sees Dr. Arboleda as his cardiologist, but has not seen him in some time.    MEDICATIONS:  Mr. Hutchins takes Naproxen occasionally for pain management.    MEDICAL HISTORY:  Mr. Hutchins has a history of atrial fibrillation (AFib), " diagnosed two years ago. He underwent cardioversion as part of his treatment.    SURGICAL HISTORY:  Mr. Hutchins underwent cardioversion two years ago as a treatment for his atrial fibrillation.    TEST RESULTS:  He completed a cardio reversion in the past as part of his AFib diagnosis process.    SOCIAL HISTORY:  Mr. Hutchins occasionally consumes beer. He is currently retired.        Review of Systems   Constitutional:  Negative for activity change, appetite change, chills, fatigue, fever and unexpected weight change.   HENT:  Negative for congestion, ear pain, sinus pressure, sore throat, trouble swallowing and voice change.    Eyes:  Negative for pain, discharge and visual disturbance.   Respiratory:  Negative for cough, chest tightness, shortness of breath and wheezing.    Cardiovascular:  Positive for palpitations. Negative for chest pain.   Gastrointestinal:  Negative for abdominal pain, constipation, diarrhea, nausea and vomiting.   Genitourinary:  Negative for difficulty urinating, flank pain, frequency and urgency.   Musculoskeletal:  Positive for arthralgias (left shoulder) and back pain (tailbone). Negative for joint swelling and neck pain.   Skin:  Positive for color change (right 2nd finger). Negative for rash.        Athletes foot, both feet   Neurological:  Negative for dizziness, seizures, syncope, weakness, numbness and headaches.   Hematological:  Negative for adenopathy.   Psychiatric/Behavioral:  Negative for dysphoric mood and sleep disturbance. The patient is not nervous/anxious.        Family History   Problem Relation Name Age of Onset    Cancer Mother      Heart disease Father        Social History     Socioeconomic History    Marital status:    Tobacco Use    Smoking status: Never    Smokeless tobacco: Never   Substance and Sexual Activity    Alcohol use: Yes     Comment: socially    Drug use: Never    Sexual activity: Yes     Partners: Female     Current Outpatient Medications  "  Medication Sig Dispense Refill    aspirin-caffeine (TITA BACK AND BODY) 500-32.5 mg Tab Take by mouth.      naproxen (NAPROSYN) 500 MG tablet Take 1 tablet (500 mg total) by mouth 2 (two) times daily. 28 tablet 0    terbinafine HCL (LAMISIL) 1 % cream Apply topically 2 (two) times daily. 28 g 3     No current facility-administered medications for this visit.     Review of patient's allergies indicates:   Allergen Reactions    Grass pollen-june grass standard Dermatitis, Shortness Of Breath and Swelling    House dust Dermatitis, Shortness Of Breath and Swelling      Past Medical History:   Diagnosis Date    A-fib 2017    Sleep apnea, unspecified 2017     Past Surgical History:   Procedure Laterality Date    cardioeversion  2017          OBJECTIVE:      Vitals:    01/29/25 0949   BP: (!) 130/90   BP Location: Left arm   Patient Position: Sitting   Pulse: 83   Temp: 98.3 °F (36.8 °C)   TempSrc: Oral   SpO2: 97%   Weight: 109.5 kg (241 lb 8 oz)   Height: 6' 4" (1.93 m)     Physical Exam  Vitals and nursing note reviewed.   Constitutional:       General: He is awake. He is not in acute distress.     Appearance: He is well-developed, well-groomed and overweight. He is not ill-appearing, toxic-appearing or diaphoretic.   HENT:      Head: Normocephalic and atraumatic.      Nose: Nose normal.   Eyes:      General: Lids are normal. Gaze aligned appropriately.      Conjunctiva/sclera: Conjunctivae normal.      Right eye: Right conjunctiva is not injected.      Left eye: Left conjunctiva is not injected.      Pupils: Pupils are equal, round, and reactive to light.   Cardiovascular:      Rate and Rhythm: Normal rate and regular rhythm.      Pulses: Normal pulses.      Heart sounds: Normal heart sounds, S1 normal and S2 normal. No murmur heard.     No friction rub. No gallop.   Pulmonary:      Effort: Pulmonary effort is normal. No respiratory distress.      Breath sounds: Normal breath sounds. No stridor. No decreased breath " sounds, wheezing, rhonchi or rales.   Chest:      Chest wall: No tenderness.   Musculoskeletal:      Left shoulder: Tenderness present. Decreased range of motion. Decreased strength.        Hands:       Cervical back: Neck supple.      Comments: Tenderness to coccyx region   Feet:      Right foot:      Toenail Condition: Fungal disease present.     Left foot:      Toenail Condition: Fungal disease present.  Lymphadenopathy:      Cervical: No cervical adenopathy.   Skin:     General: Skin is warm and dry.      Capillary Refill: Capillary refill takes less than 2 seconds.      Findings: No erythema or rash.   Neurological:      Mental Status: He is alert and oriented to person, place, and time. Mental status is at baseline.   Psychiatric:         Attention and Perception: Attention normal.         Mood and Affect: Mood normal.         Speech: Speech normal.         Behavior: Behavior normal. Behavior is cooperative.         Thought Content: Thought content normal.         Judgment: Judgment normal.            Assessment:       1. Paroxysmal atrial fibrillation    2. Palpitations    3. Other hyperlipidemia    4. Tinea pedis of both feet    5. Chronic left shoulder pain    6. Chronic paronychia of finger of right hand    7. Pain in the coccyx    8. Screening for prostate cancer        Plan:       Assessment & Plan    IMPRESSION:  - Evaluated left shoulder pain with concern for instability; ordered x-rays  - Assessed recurrent coccyx pain; ordered x-rays for further evaluation  - Examined persistent right finger pain and inflammation; considering possible infection or nail-related issue  - Noted history of AFib with recent palpitations  - Assessed athlete's foot symptoms    ATRIAL FIBRILLATION:  - Referred the patient to cardiologist Dr. Arboleda for follow-up regarding atrial fibrillation and recent palpitations.  - Mr. Hutchins reports experiencing palpitations, irregular heartbeats, and shortness of breath.  -  Acknowledged patient's history of atrial fibrillation and previous cardioversion.  - Recognized the need for cardiac check-up.  - He will need a Holter monitor, likely 30 day monitor due to the infrequency of the palpitations.     SHOULDER PAIN AND INSTABILITY:  - Prescribed naproxen for shoulder pain.  - Ordered x-rays of right shoulder.  - Will refer the patient to orthopedics if shoulder x-rays are unremarkable.  - Mr. Hutchins reports right shoulder pain and instability for 2 months.  - No history of injury, trauma, or heavy lifting related to the shoulder pain was noted.  - Mr. Hutchins reports feeling of right shoulder instability while walking.  - No history of injury, trauma, or heavy lifting related to the shoulder instability was noted.  - Prescribed anti-inflammatories for shoulder condition.    COCCYX PAIN:  - Prescribed naproxen for coccyx pain.  - Ordered x-rays of coccyx region.  - Mr. Hutchins reports recurrent tailbone pain when sitting for 5-10 minutes.  - No history of cyst or injury in the tailbone area was noted.    ATHLETE'S FOOT:  - Prescribed topical antifungal cream for athlete's foot.  - Mr. Hutchins reports itching and white substance between toes on both feet.    FINGER INFLAMMATION:  - Discussed potential need for nail removal if finger condition persists, noting this would require specialist intervention.  - Instructed the patient to perform warm soaks on affected finger several times daily using betadine, chlorhexidine, or antibacterial soap.  - Referred the patient to dermatology for persistent finger issue.  - Mr. Hutchins reports persistent pain and tenderness in right finger for about 6 months, with a feeling of heat in the affected finger.  - Noted possible inflammation in the finger.  - Naproxen prn pain.    COLONOSCOPY SCREENING:  - Plan to review medical records to determine when last colonoscopy was performed and if due for another one.  - Instructed the patient to contact office if  interested in scheduling a colonoscopy.  - Inquired about patient's last colonoscopy and discussed need for another screening.  - Offered to place order for colonoscopy.    LABS:  - Ordered complete blood panel workup.  - Will communicate results of x-rays and lab work via patient portal.    FOLLOW-UP:  - Follow-up in 1 month to recheck blood pressure and review labs.       Paroxysmal atrial fibrillation  -     Ambulatory referral/consult to Cardiology; Future; Expected date: 02/05/2025  -     CBC Auto Differential; Future; Expected date: 01/29/2025  -     Comprehensive Metabolic Panel; Future; Expected date: 01/29/2025  -     Lipid Panel; Future; Expected date: 01/29/2025  -     TSH; Future; Expected date: 01/29/2025  -     Magnesium; Future; Expected date: 01/29/2025    Palpitations  -     Ambulatory referral/consult to Cardiology; Future; Expected date: 02/05/2025  -     CBC Auto Differential; Future; Expected date: 01/29/2025  -     Comprehensive Metabolic Panel; Future; Expected date: 01/29/2025  -     Lipid Panel; Future; Expected date: 01/29/2025  -     TSH; Future; Expected date: 01/29/2025  -     Magnesium; Future; Expected date: 01/29/2025    Other hyperlipidemia  -     Comprehensive Metabolic Panel; Future; Expected date: 01/29/2025  -     Lipid Panel; Future; Expected date: 01/29/2025  -     TSH; Future; Expected date: 01/29/2025    Tinea pedis of both feet  -     terbinafine HCL (LAMISIL) 1 % cream; Apply topically 2 (two) times daily.  Dispense: 28 g; Refill: 3    Chronic left shoulder pain  -     X-Ray Shoulder 2 or More Views Left; Future; Expected date: 01/29/2025  -     naproxen (NAPROSYN) 500 MG tablet; Take 1 tablet (500 mg total) by mouth 2 (two) times daily.  Dispense: 28 tablet; Refill: 0    Chronic paronychia of finger of right hand  -     Ambulatory referral/consult to Dermatology; Future; Expected date: 02/05/2025    Pain in the coccyx  -     X-Ray Sacrum And Coccyx; Future; Expected date:  01/29/2025  -     naproxen (NAPROSYN) 500 MG tablet; Take 1 tablet (500 mg total) by mouth 2 (two) times daily.  Dispense: 28 tablet; Refill: 0    Screening for prostate cancer  -     PSA, Screening; Future; Expected date: 01/29/2025    I spent a total of 28 minutes on the day of the visit.This includes face to face time and non-face to face time preparing to see the patient (eg, review of tests), obtaining and/or reviewing separately obtained history, documenting clinical information in the electronic or other health record, independently interpreting results and communicating results to the patient/family/caregiver, or care coordinator.    Follow up in about 1 month (around 2/28/2025).          1/29/2025 ERIKA Billings, SENTHIL    This note was generated with the assistance of ambient listening technology. Verbal consent was obtained by the patient and accompanying visitor(s) for the recording of patient appointment to facilitate this note. I attest to having reviewed and edited the generated note for accuracy, though some syntax or spelling errors may persist. Please contact the author of this note for any clarification.

## 2025-01-31 ENCOUNTER — PATIENT MESSAGE (OUTPATIENT)
Dept: FAMILY MEDICINE | Facility: CLINIC | Age: 50
End: 2025-01-31
Payer: OTHER GOVERNMENT

## 2025-02-03 ENCOUNTER — TELEPHONE (OUTPATIENT)
Dept: FAMILY MEDICINE | Facility: CLINIC | Age: 50
End: 2025-02-03
Payer: OTHER GOVERNMENT

## 2025-02-03 NOTE — TELEPHONE ENCOUNTER
Spoke to pt he st that he does not recall the provider he seen. He st he will call office back with that information.

## 2025-02-03 NOTE — TELEPHONE ENCOUNTER
----- Message from BARBIE Billings sent at 2/2/2025  9:20 PM CST -----  Regarding: RE: referral for colonoscopy  See if he has a preference on gastroenterologists or does he want me to just put in a case request for a colonoscopy.  ----- Message -----  From: Boeckelman, Jeanne, LPN  Sent: 1/31/2025  11:06 AM CST  To: BARBIE Hill  Subject: referral for colonoscopy                           ----- Message -----  From: Jackie Houser MA  Sent: 1/31/2025  10:59 AM CST  To: [facility] Staff    Pt sees he is due for colon cancer screening he would like to be referred     PT#[phone]

## 2025-02-24 ENCOUNTER — PATIENT MESSAGE (OUTPATIENT)
Dept: FAMILY MEDICINE | Facility: CLINIC | Age: 50
End: 2025-02-24
Payer: OTHER GOVERNMENT

## 2025-03-24 ENCOUNTER — PATIENT MESSAGE (OUTPATIENT)
Dept: FAMILY MEDICINE | Facility: CLINIC | Age: 50
End: 2025-03-24
Payer: OTHER GOVERNMENT

## 2025-04-17 ENCOUNTER — OFFICE VISIT (OUTPATIENT)
Dept: FAMILY MEDICINE | Facility: CLINIC | Age: 50
End: 2025-04-17
Payer: OTHER GOVERNMENT

## 2025-04-17 VITALS
BODY MASS INDEX: 28.3 KG/M2 | HEIGHT: 76 IN | WEIGHT: 232.38 LBS | SYSTOLIC BLOOD PRESSURE: 122 MMHG | OXYGEN SATURATION: 98 % | HEART RATE: 86 BPM | TEMPERATURE: 99 F | DIASTOLIC BLOOD PRESSURE: 82 MMHG

## 2025-04-17 DIAGNOSIS — Z00.00 ANNUAL PHYSICAL EXAM: Primary | ICD-10-CM

## 2025-04-17 DIAGNOSIS — R00.2 PALPITATIONS: ICD-10-CM

## 2025-04-17 DIAGNOSIS — I49.3 PVC'S (PREMATURE VENTRICULAR CONTRACTIONS): ICD-10-CM

## 2025-04-17 DIAGNOSIS — J30.9 ALLERGIC RHINITIS, UNSPECIFIED SEASONALITY, UNSPECIFIED TRIGGER: ICD-10-CM

## 2025-04-17 PROCEDURE — 99396 PREV VISIT EST AGE 40-64: CPT | Mod: S$PBB,,, | Performed by: NURSE PRACTITIONER

## 2025-04-17 PROCEDURE — 99999 PR PBB SHADOW E&M-EST. PATIENT-LVL III: CPT | Mod: PBBFAC,,, | Performed by: NURSE PRACTITIONER

## 2025-04-17 PROCEDURE — 99213 OFFICE O/P EST LOW 20 MIN: CPT | Mod: PBBFAC,PN | Performed by: NURSE PRACTITIONER

## 2025-04-17 RX ORDER — CALCIUM CARBONATE 300MG(750)
1 TABLET,CHEWABLE ORAL NIGHTLY
Qty: 90 TABLET | Refills: 3 | Status: SHIPPED | OUTPATIENT
Start: 2025-04-17

## 2025-04-17 RX ORDER — CALCIUM CARBONATE 300MG(750)
1 TABLET,CHEWABLE ORAL NIGHTLY
COMMUNITY
Start: 2025-04-02 | End: 2025-04-17 | Stop reason: SDUPTHER

## 2025-04-17 NOTE — PROGRESS NOTES
SUBJECTIVE:      Patient ID: Jagdish Hutchins is a 49 y.o. male.    Chief Complaint: Annual Exam    History of Present Illness    CHIEF COMPLAINT:  Mr. Hutchins presents for an annual wellness visit.    HPI:  Mr. Hutchins reports recent recovery from a severe upper respiratory illness lasting 4-6 weeks, with sinus and chest symptoms and significant mucus production. Although improved, he still has residual mucus and cough, occasionally expectorating secretions. He denies fever with his recent illness.    He has a history of palpitations, evaluated by a cardiologist. A Holter monitor revealed numerous PVCs (Premature Ventricular Contractions). The cardiologist recommended magnesium supplementation, but he was uncertain about using OTC or prescription formulations.    During a recent medical visit, he had elevated blood pressure, causing him stress. Upon recheck by a nurse practitioner, his blood pressure was lower and within normal range. He states his blood pressure is always normal.     MEDICATIONS:  Mr. Hutchins is on Magnesium oxide 400 mg, taking 1 tablet at bedtime for palpitations/PVCs. He has discontinued an antihistamine that was taken once every 24 hours.    MEDICAL HISTORY:  Mr. Hutchins has a history of palpitations, PVCs (premature ventricular contractions), and mild valvular regurgitation. He received the flu vaccine this season.    SURGICAL HISTORY:  Mr. Hutchins had a previous colonoscopy procedure done in [place] at a gastroenterology group near [place].    TEST RESULTS:  Earlier this year, the patient underwent several labs. His CBC, kidney function, liver function, and electrolytes were all good. The cholesterol panel showed an LDL of 107, with good triglycerides and HDL levels. His magnesium levels were normal, and his PSA was 0.26, which was fine. Mr. Hutchins recently underwent a Holter monitor test, which showed a lot of PVCs. He also had a stress test that came back fine.    IMAGING:  A recent  echocardiogram showed good EF and some mild regurgitation in a few of the valves.    SOCIAL HISTORY:  Denies smoking  Denies alcohol use  Denies drug use Occupation: Employed        Review of Systems   Constitutional:  Negative for activity change, appetite change, chills, diaphoresis, fatigue, fever and unexpected weight change.   HENT:  Negative for congestion, ear pain, sinus pressure, sore throat, trouble swallowing and voice change.    Eyes:  Negative for pain, discharge and visual disturbance.   Respiratory:  Negative for cough, chest tightness, shortness of breath and wheezing.    Cardiovascular:  Negative for chest pain and palpitations.   Gastrointestinal:  Negative for abdominal pain, constipation, diarrhea, nausea and vomiting.   Genitourinary:  Negative for difficulty urinating, flank pain, frequency and urgency.   Musculoskeletal:  Negative for back pain and joint swelling.   Skin:  Negative for color change and rash.   Neurological:  Negative for dizziness, seizures, syncope, weakness, numbness and headaches.   Hematological:  Negative for adenopathy.   Psychiatric/Behavioral:  Negative for dysphoric mood and sleep disturbance. The patient is not nervous/anxious.        Family History   Problem Relation Name Age of Onset    Cancer Mother      Heart disease Father        Social History     Socioeconomic History    Marital status:    Tobacco Use    Smoking status: Never    Smokeless tobacco: Never   Substance and Sexual Activity    Alcohol use: Yes     Comment: socially    Drug use: Never    Sexual activity: Yes     Partners: Female     Current Outpatient Medications   Medication Sig    aspirin-caffeine (TITA BACK AND BODY) 500-32.5 mg Tab Take by mouth.    magnesium oxide 400 mg magnesium Tab Take 1 tablet by mouth every evening.   Last reviewed on 1/29/2025 10:15 AM by Renny Chatman, SENTHIL-C    Review of patient's allergies indicates:   Allergen Reactions    Grass pollen-june grass  "standard Dermatitis, Shortness Of Breath and Swelling    House dust Dermatitis, Shortness Of Breath and Swelling      Past Medical History:   Diagnosis Date    A-fib 2017    Sleep apnea, unspecified 2017     Past Surgical History:   Procedure Laterality Date    cardioeversion  2017          OBJECTIVE:      Vitals:    04/17/25 0919   BP: 122/82   BP Location: Left arm   Patient Position: Sitting   Pulse: 86   Temp: 98.6 °F (37 °C)   TempSrc: Oral   SpO2: 98%   Weight: 105.4 kg (232 lb 5.8 oz)   Height: 6' 4" (1.93 m)     Physical Exam  Vitals and nursing note reviewed.   Constitutional:       General: He is awake. He is not in acute distress.     Appearance: He is well-developed, well-groomed and overweight. He is not ill-appearing, toxic-appearing or diaphoretic.   HENT:      Head: Normocephalic and atraumatic.      Nose: Nose normal.   Eyes:      General: Lids are normal. Gaze aligned appropriately.      Conjunctiva/sclera: Conjunctivae normal.      Right eye: Right conjunctiva is not injected.      Left eye: Left conjunctiva is not injected.      Pupils: Pupils are equal, round, and reactive to light.   Cardiovascular:      Rate and Rhythm: Normal rate and regular rhythm.      Pulses: Normal pulses.      Heart sounds: Normal heart sounds, S1 normal and S2 normal. No murmur heard.     No friction rub. No gallop.   Pulmonary:      Effort: Pulmonary effort is normal. No respiratory distress.      Breath sounds: Normal breath sounds. No stridor. No decreased breath sounds, wheezing, rhonchi or rales.   Chest:      Chest wall: No tenderness.   Abdominal:      General: There is no distension.      Palpations: Abdomen is soft.      Tenderness: There is no abdominal tenderness. There is no guarding or rebound.   Musculoskeletal:      Cervical back: Neck supple.      Right lower leg: No edema.      Left lower leg: No edema.   Lymphadenopathy:      Cervical: No cervical adenopathy.   Skin:     General: Skin is warm and dry. "      Capillary Refill: Capillary refill takes less than 2 seconds.      Findings: No erythema or rash.   Neurological:      Mental Status: He is alert and oriented to person, place, and time. Mental status is at baseline.   Psychiatric:         Attention and Perception: Attention normal.         Mood and Affect: Mood normal.         Speech: Speech normal.         Behavior: Behavior normal. Behavior is cooperative.         Thought Content: Thought content normal.         Judgment: Judgment normal.       Lab Visit on 01/29/2025   Component Date Value Ref Range Status    WBC 01/29/2025 6.80  3.90 - 12.70 K/uL Final    RBC 01/29/2025 4.68  4.60 - 6.20 M/uL Final    Hemoglobin 01/29/2025 13.9 (L)  14.0 - 18.0 g/dL Final    Hematocrit 01/29/2025 42.2  40.0 - 54.0 % Final    MCV 01/29/2025 90  82 - 98 fL Final    MCH 01/29/2025 29.7  27.0 - 31.0 pg Final    MCHC 01/29/2025 32.9  32.0 - 36.0 g/dL Final    RDW 01/29/2025 11.6  11.5 - 14.5 % Final    Platelets 01/29/2025 219  150 - 450 K/uL Final    MPV 01/29/2025 10.3  9.2 - 12.9 fL Final    Immature Granulocytes 01/29/2025 0.4  0.0 - 0.5 % Final    Gran # (ANC) 01/29/2025 4.4  1.8 - 7.7 K/uL Final    Immature Grans (Abs) 01/29/2025 0.03  0.00 - 0.04 K/uL Final    Comment: Mild elevation in immature granulocytes is non specific and   can be seen in a variety of conditions including stress response,   acute inflammation, trauma and pregnancy. Correlation with other   laboratory and clinical findings is essential.      Lymph # 01/29/2025 1.5  1.0 - 4.8 K/uL Final    Mono # 01/29/2025 0.7  0.3 - 1.0 K/uL Final    Eos # 01/29/2025 0.2  0.0 - 0.5 K/uL Final    Baso # 01/29/2025 0.02  0.00 - 0.20 K/uL Final    nRBC 01/29/2025 0  0 /100 WBC Final    Gran % 01/29/2025 64.8  38.0 - 73.0 % Final    Lymph % 01/29/2025 21.6  18.0 - 48.0 % Final    Mono % 01/29/2025 10.3  4.0 - 15.0 % Final    Eosinophil % 01/29/2025 2.6  0.0 - 8.0 % Final    Basophil % 01/29/2025 0.3  0.0 - 1.9 % Final     Differential Method 01/29/2025 Automated   Final    Sodium 01/29/2025 139  136 - 145 mmol/L Final    Potassium 01/29/2025 3.8  3.5 - 5.1 mmol/L Final    Chloride 01/29/2025 105  95 - 110 mmol/L Final    CO2 01/29/2025 27  23 - 29 mmol/L Final    Glucose 01/29/2025 86  70 - 110 mg/dL Final    BUN 01/29/2025 14  6 - 20 mg/dL Final    Creatinine 01/29/2025 1.0  0.5 - 1.4 mg/dL Final    Calcium 01/29/2025 9.2  8.7 - 10.5 mg/dL Final    Total Protein 01/29/2025 7.4  6.0 - 8.4 g/dL Final    Albumin 01/29/2025 4.2  3.5 - 5.2 g/dL Final    Total Bilirubin 01/29/2025 0.8  0.1 - 1.0 mg/dL Final    Comment: For infants and newborns, interpretation of results should be based  on gestational age, weight and in agreement with clinical  observations.    Premature Infant recommended reference ranges:  Up to 24 hours.............<8.0 mg/dL  Up to 48 hours............<12.0 mg/dL  3-5 days..................<15.0 mg/dL  6-29 days.................<15.0 mg/dL      Alkaline Phosphatase 01/29/2025 48 (L)  55 - 135 U/L Final    AST 01/29/2025 28  10 - 40 U/L Final    ALT 01/29/2025 19  10 - 44 U/L Final    eGFR 01/29/2025 >60.0  >60 mL/min/1.73 m^2 Final    Anion Gap 01/29/2025 7 (L)  8 - 16 mmol/L Final    Cholesterol 01/29/2025 182  120 - 199 mg/dL Final    Comment: The National Cholesterol Education Program (NCEP) has set the  following guidelines (reference ranges) for Cholesterol:  Optimal.....................<200 mg/dL  Borderline High.............200-239 mg/dL  High........................> or = 240 mg/dL      Triglycerides 01/29/2025 97  30 - 150 mg/dL Final    Comment: The National Cholesterol Education Program (NCEP) has set the  following guidelines (reference values) for triglycerides:  Normal......................<150 mg/dL  Borderline High.............150-199 mg/dL  High........................200-499 mg/dL      HDL 01/29/2025 55  40 - 75 mg/dL Final    Comment: The National Cholesterol Education Program (NCEP) has set  the  following guidelines (reference values) for HDL Cholesterol:  Low...............<40 mg/dL  Optimal...........>60 mg/dL      LDL Cholesterol 01/29/2025 107.6  63.0 - 159.0 mg/dL Final    Comment: The National Cholesterol Education Program (NCEP) has set the  following guidelines (reference values) for LDL Cholesterol:  Optimal.......................<130 mg/dL  Borderline High...............130-159 mg/dL  High..........................160-189 mg/dL  Very High.....................>190 mg/dL      HDL/Cholesterol Ratio 01/29/2025 30.2  20.0 - 50.0 % Final    Total Cholesterol/HDL Ratio 01/29/2025 3.3  2.0 - 5.0 Final    Non-HDL Cholesterol 01/29/2025 127  mg/dL Final    Comment: Risk category and Non-HDL cholesterol goals:  Coronary heart disease (CHD)or equivalent (10-year risk of CHD >20%):  Non-HDL cholesterol goal     <130 mg/dL  Two or more CHD risk factors and 10-year risk of CHD <= 20%:  Non-HDL cholesterol goal     <160 mg/dL  0 to 1 CHD risk factor:  Non-HDL cholesterol goal     <190 mg/dL      TSH 01/29/2025 0.820  0.340 - 5.600 uIU/mL Final    Magnesium 01/29/2025 1.9  1.6 - 2.6 mg/dL Final    PSA, Screen 01/29/2025 0.26  0.00 - 4.00 ng/mL Final    Comment: The testing method is a chemiluminescent immunoassay manufactured by   [employer] and performed on the [employer]oassay Systems. Values   obtained with different assay manufacturers for methods may be   different and   cannot be used interchangeably          Assessment:       1. Annual physical exam    2. Palpitations    3. PVC's (premature ventricular contractions)    4. Allergic rhinitis, unspecified seasonality, unspecified trigger        Plan:       Assessment & Plan    PLAN SUMMARY:  - Prescribed magnesium oxide 400 mg, 1 tablet at bedtime  - Restart Zyrtec and Flonase for approximately 1 month to help with cough and allergies  - Obtain a copy of previous colonoscopy screening done by Dr. Doty in [place]  - Continue current  exercise routine  - Follow up with cardiology on May 29th at 9:30  - Follow up in one year for next annual visit    VENTRICULAR PREMATURE DEPOLARIZATION (PVCS):  - Assessed recent cardiology workup, including Holter monitor showing PVCs and stress test results.  - Reviewed stress test results, which showed some PVCs along with echocardiogram, but overall looked fine with good EF.  - Prescribed magnesium oxide 400 mg, 1 tablet at bedtime.  - Scheduled follow-up visit with cardiology for May 29th at 9:30.  - Instructed the patient that the cardiologist will review all findings during the follow-up visit in May.    UPPER RESPIRATORY INFECTION AND ALLERGIC RHINITIS:  - Evaluated the patient's persistent mucus in sinuses and chest for about 1.5 months, with ongoing cough productive of mucus.  - No fever reported.  - Assessed ongoing chest congestion symptoms in the context of allergy season.  - Recommend continuing Zyrtec and Flonase for another month.    DIGESTIVE SYSTEM HISTORY:  - Inquired about previous colonoscopy and plans for future screening.  - Planned to obtain a copy of previous colonoscopy screening.    FOLLOW-UP AND GENERAL RECOMMENDATIONS:  - Mr. Hutchins to continue current exercise routine.  - Follow up in one year for next annual visit.  - Contact the office if BP becomes elevated.        Annual physical exam    Palpitations  -     magnesium oxide 400 mg magnesium Tab; Take 1 tablet by mouth every evening.  Dispense: 90 tablet; Refill: 3    PVC's (premature ventricular contractions)  -     magnesium oxide 400 mg magnesium Tab; Take 1 tablet by mouth every evening.  Dispense: 90 tablet; Refill: 3    Allergic rhinitis, unspecified seasonality, unspecified trigger    I spent a total of 23 minutes on the day of the visit.This includes face to face time and non-face to face time preparing to see the patient (eg, review of tests), obtaining and/or reviewing separately obtained history, documenting clinical  information in the electronic or other health record, independently interpreting results and communicating results to the patient/family/caregiver, or care coordinator.    Follow up in about 1 year (around 4/17/2026) for Annual Physical.          4/17/2025 ERIKA Billings, SENTHIL    This note was generated with the assistance of ambient listening technology. Verbal consent was obtained by the patient and accompanying visitor(s) for the recording of patient appointment to facilitate this note. I attest to having reviewed and edited the generated note for accuracy, though some syntax or spelling errors may persist. Please contact the author of this note for any clarification.

## 2025-04-23 ENCOUNTER — PATIENT OUTREACH (OUTPATIENT)
Dept: ADMINISTRATIVE | Facility: HOSPITAL | Age: 50
End: 2025-04-23
Payer: OTHER GOVERNMENT

## 2025-04-23 NOTE — PROGRESS NOTES
Population Health Chart Review & Patient Outreach Details      Additional [facility] Health Notes:               Updates Requested / Reviewed:      Updated Care Coordination Note, Care Everywhere, , Care Team Updated, and Immunizations Reconciliation Completed or Queried: [facility] Topics Overdue:      [facility] Score: 1     Colon Cancer Screening                       Health Maintenance Topic(s) Outreach Outcomes & Actions Taken:    Colorectal Cancer Screening - Outreach Outcomes & Actions Taken  : External Records Requested & Care Team Updated if Applicable and External Records Uploaded, Care Team Updated, & History Updated if Applicable

## 2025-04-23 NOTE — LETTER
AUTHORIZATION FOR RELEASE OF   CONFIDENTIAL INFORMATION    Dear Dr. Liban Doty,    We are seeing Jagdish Hutchins, date of birth 1975, in the clinic at [facility]. Renyn Chatman FNP-C is the patient's PCP. Jagdish Hutchins has an outstanding lab/procedure at the time we reviewed his chart. In order to help keep his health information updated, he has authorized us to request the following medical record(s):            (  x)  COLONOSCOPY                                          Please fax records to [facility], [place], Christopher Aaron, FNP-C, [phone]     If you have any questions, please contact       Jazmyne Grant  Nurse Clinical Care Coordinator  [facility]  Phone: [phone]  Fax: [phone]      Patient Name: Jagdish Hutchins  : 1975  Patient Phone #: [phone]

## 2025-08-12 ENCOUNTER — OFFICE VISIT (OUTPATIENT)
Dept: FAMILY MEDICINE | Facility: CLINIC | Age: 50
End: 2025-08-12
Payer: OTHER GOVERNMENT

## 2025-08-12 VITALS
HEIGHT: 76 IN | SYSTOLIC BLOOD PRESSURE: 132 MMHG | OXYGEN SATURATION: 99 % | HEART RATE: 78 BPM | BODY MASS INDEX: 28.54 KG/M2 | WEIGHT: 234.38 LBS | DIASTOLIC BLOOD PRESSURE: 81 MMHG

## 2025-08-12 DIAGNOSIS — L24.7 IRRITANT CONTACT DERMATITIS DUE TO PLANTS, EXCEPT FOOD: ICD-10-CM

## 2025-08-12 DIAGNOSIS — G47.33 OSA (OBSTRUCTIVE SLEEP APNEA): ICD-10-CM

## 2025-08-12 DIAGNOSIS — R13.10 DYSPHAGIA, UNSPECIFIED TYPE: ICD-10-CM

## 2025-08-12 DIAGNOSIS — I48.91 ATRIAL FIBRILLATION, UNSPECIFIED TYPE: Primary | ICD-10-CM

## 2025-08-12 PROCEDURE — 99999 PR PBB SHADOW E&M-EST. PATIENT-LVL IV: CPT | Mod: PBBFAC,,, | Performed by: NURSE PRACTITIONER

## 2025-08-12 PROCEDURE — 99214 OFFICE O/P EST MOD 30 MIN: CPT | Mod: PBBFAC,PN | Performed by: NURSE PRACTITIONER

## 2025-08-12 PROCEDURE — 99214 OFFICE O/P EST MOD 30 MIN: CPT | Mod: S$PBB,,, | Performed by: NURSE PRACTITIONER

## 2025-08-12 PROCEDURE — G2211 COMPLEX E/M VISIT ADD ON: HCPCS | Mod: 95,,, | Performed by: NURSE PRACTITIONER

## 2025-08-12 RX ORDER — CLOBETASOL PROPIONATE 0.5 MG/G
CREAM TOPICAL 2 TIMES DAILY
Qty: 60 G | Refills: 3 | Status: SHIPPED | OUTPATIENT
Start: 2025-08-12